# Patient Record
Sex: MALE | Race: WHITE
[De-identification: names, ages, dates, MRNs, and addresses within clinical notes are randomized per-mention and may not be internally consistent; named-entity substitution may affect disease eponyms.]

---

## 2018-03-26 NOTE — EDPHYS
Physician Documentation                                                                           

 [facility]                                                                

Name: Duran Hodge                                                                         

Age: 17 yrs                                                                                       

Sex: Male                                                                                         

: 2000                                                                                   

MRN: [id number]                                                                                   

Arrival Date: 2018                                                                          

Time: 15:24                                                                                       

Account#: [id number]                                                                            

Bed 18                                                                                            

Private MD:                                                                                       

ED Physician Aldair Bess                                                                      

HPI:                                                                                              

                                                                                             

20:00 This 17 yrs old  Male presents to ER via Ambulatory with complaints of Back    pm1 

      Pain.                                                                                       

20:00 The patient presents with pain and an injury. The symptoms are located in the low back. pm1 

      Onset: The symptoms/episode began/occurred 1 month(s) ago. The pain does not radiate.       

      Associated signs and symptoms: Pertinent negatives: abdominal pain, chest pain,             

      dysuria, fever, hematuria, nausea, vomiting. 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      

      cranking a lever repetitively daily. Onset immediately after cranking the lever at work     

      about 1 month ago that comes and goes. Modifying factors: The patient symptoms are          

      alleviated by remaining still, rest, the patient symptoms are aggravated by bending,        

      movement. Severity of symptoms: in the emergency department the symptoms have improved.     

      Work clinic evaluated that patient at recommended evaluation at the emergency               

      department.                                                                                 

                                                                                                  

Historical:                                                                                       

- Allergies:                                                                                      

15:29 No Known Allergies;                                                                     hj  

- Home Meds:                                                                                      

15:29 None [Active];                                                                          hj  

- PMHx:                                                                                           

15:29 None;                                                                                   hj  

- PSHx:                                                                                           

15:29 None;                                                                                   hj  

                                                                                                  

- Immunization history:: Adult Immunizations up to date.                                          

- Social history:: Smoking status: Patient/guardian denies using tobacco.                         

                                                                                                  

                                                                                                  

ROS:                                                                                              

20:00 Constitutional: Negative for fever, chills, and weight loss, Eyes: Negative for injury, pm1 

      pain, redness, and discharge, ENT: Negative for injury, pain, and discharge, Neck:          

      Negative for injury, pain, and swelling, Cardiovascular: Negative for chest pain,           

      palpitations, and edema, Respiratory: Negative for shortness of breath, cough,              

      wheezing, and pleuritic chest pain, Abdomen/GI: Negative for abdominal pain, nausea,        

      vomiting, diarrhea, and constipation.                                                       

20:00 : Negative for injury, bleeding, discharge, and swelling, MS/Extremity: Negative for      

      injury and deformity, Skin: Negative for injury, rash, and discoloration, Neuro:            

      Negative for headache, weakness, numbness, tingling, and seizure.                           

20:00 Back: Positive for of the left low back and right low back.                                 

                                                                                                  

Exam:                                                                                             

20:00 Constitutional:  This is a well developed, well nourished patient who is awake, alert,  pm1 

      and in no acute distress. Head/Face:  Normocephalic, atraumatic. Chest/axilla:  Normal      

      chest wall appearance and motion.  Nontender with no deformity.  No lesions are             

      appreciated. Cardiovascular:  Regular rate and rhythm with a normal S1 and S2.  No          

      gallops, murmurs, or rubs.  Normal PMI, no JVD.  No pulse deficits. Respiratory:  Lungs     

      have equal breath sounds bilaterally, clear to auscultation and percussion.  No rales,      

      rhonchi or wheezes noted.  No increased work of breathing, no retractions or nasal          

      flaring. Abdomen/GI:  Soft, non-tender, with normal bowel sounds.  No distension or         

      tympany.  No guarding or rebound.  No evidence of tenderness throughout.                    

20:00 Skin:  Warm, dry with normal turgor.  Normal color with no rashes, no lesions, and no       

      evidence of cellulitis. MS/ Extremity:  Pulses equal, no cyanosis.  Neurovascular           

      intact.  Full, normal range of motion.                                                      

20:00 Back: ROM is painful, with rotation to the right, with rotation to the left, with           

      flexion, normal spinal alignment noted, muscle spasm, is appreciated in the left low        

      back and right low back.                                                                    

20:00 Neuro: Orientation: is normal, Mentation: is normal, Motor: is normal, moves all fours,     

      strength is normal, strength is 5/5 in all extremities, Sensation: is normal, no            

      obvious gross deficits, Gait: is steady, at a normal pace, without difficulty.              

20:00 Neuro: Deep tendon reflexes are 2+ (normal) in the  right patellar and left patellar.   pm1 

                                                                                                  

Vital Signs:                                                                                      

15:29  / 89; Pulse 65; Resp 18; Temp 97.4(TE); Pulse Ox 100% on R/A; Weight 73.03 kg;   hj  

      Height 5 ft. 10 in. (177.80 cm); Pain 8/10;                                                 

15:29 Body Mass Index 23.10 (73.03 kg, 177.80 cm)                                               

                                                                                                  

MDM:                                                                                              

17:21 Patient medically screened.                                                             [facility] 

18:14 Data reviewed: vital signs. Data interpreted: Pulse oximetry: on room air is 100 %.     pm1 

      Interpretation: normal. Counseling: I had a detailed discussion with the patient and/or     

      guardian regarding: the historical points, exam findings, and any diagnostic results        

      supporting the discharge/admit diagnosis, the need for outpatient follow up, to return      

      to the emergency department if symptoms worsen or persist or if there are any questions     

      or concerns that arise at home.                                                             

                                                                                                  

Administered Medications:                                                                         

No medications were administered                                                                  

                                                                                                  

                                                                                                  

Disposition:                                                                                      

                                                                                             

06:55 Co-signature as Attending Physician, Aldair Bess MD I agree with the assessment and  amparo 

      plan of care.                                                                               

                                                                                                  

Disposition:                                                                                      

18 18:15 Discharged to Home. Impression: Strain of muscle, fascia and tendon of lower       

  back.                                                                                           

- Condition is Stable.                                                                            

- Discharge Instructions: Back Pain, Adult, Muscle Strain, Back Injury Prevention,                

  Easy-to-Read.                                                                                   

- Prescriptions for Naprosyn 500 mg Oral Tablet - take 1 tablet by ORAL route 2 times             

  per day take with food; 30 tablet. Tylenol- Codeine #3 300-30 mg Oral Tablet - take 1           

  tablet by ORAL route every 6 hours As needed; 15 tablet. Cyclobenzaprine 10 mg Oral             

  Tablet - take 1 tablet by ORAL route every 8 hours As needed; 30 tablet.                        

- Work release form, Medication Reconciliation Form, Thank You Letter, Prescription               

  Opioid Use form.                                                                                

- Follow up: Emergency Department; When: As needed; Reason: Worsening of condition.               

  Follow up: Private Physician; When: 2 - 3 days; Reason: Recheck today's complaints,             

  Continuance of care, Re-evaluation by your physician.                                           

- Problem is new.                                                                                 

- Symptoms have improved.                                                                         

                                                                                                  

                                                                                                  

                                                                                                  

Signatures:                                                                                       

Aldair Bess MD MD cha Munoz, Edgar, MARJORIEN                       LVN  Mynor Martin, MANDO                      RN   David Baldwin NP                    NP   pm1                                                  

                                                                                                  

**************************************************************************************************

## 2018-03-26 NOTE — ER
Nurse's Notes                                                                                     

 [facility]                                                                

Name: Duran Hodge                                                                         

Age: 17 yrs                                                                                       

Sex: Male                                                                                         

: 2000                                                                                   

MRN: [id number]                                                                                   

Arrival Date: 2018                                                                          

Time: 15:24                                                                                       

Account#: [id number]                                                                            

Bed 18                                                                                            

Private MD:                                                                                       

Diagnosis: Strain of muscle, fascia and tendon of lower back                                      

                                                                                                  

Presentation:                                                                                     

                                                                                             

15:27 Presenting complaint: Patient states: its been a month, i was bending, lifting,         hj  

      twisting on my job and i felt my back was hurting and its getting worse; now the pain       

      moves to the front lower sides;. Transition of care: patient was not received from          

      another setting of care. Onset of symptoms was 2018. Care prior to arrival:       

      None.                                                                                       

15:27 Method Of Arrival: Ambulatory                                                             

15:27 Acuity: JESSICA 4                                                                           hj  

                                                                                                  

Triage Assessment:                                                                                

15:29 General: Appears in no apparent distress. uncomfortable, Behavior is calm, cooperative, hj  

      appropriate for age. Pain: Complains of pain in lumbar area, left low back and right        

      low back. GI: Reports lower abdominal pain.                                                 

                                                                                                  

Historical:                                                                                       

- Allergies:                                                                                      

15:29 No Known Allergies;                                                                     hj  

- Home Meds:                                                                                      

15:29 None [Active];                                                                          hj  

- PMHx:                                                                                           

15:29 None;                                                                                   hj  

- PSHx:                                                                                           

15:29 None;                                                                                   hj  

                                                                                                  

- Immunization history:: Adult Immunizations up to date.                                          

- Social history:: Smoking status: Patient/guardian denies using tobacco.                         

                                                                                                  

                                                                                                  

Screenin:42 Abuse screen: Denies threats or abuse. Nutritional screening: No deficits noted.        em  

      Tuberculosis screening: No symptoms or risk factors identified. Fall Risk None              

      identified.                                                                                 

                                                                                                  

Assessment:                                                                                       

15:32 GI: Bowel sounds present X 4 quads. Abd is soft.                                        hj  

17:45 General: Appears in no apparent distress. comfortable, Behavior is calm, cooperative,   em  

      reports he hurt his lower back while at work, radiates to his abdomen, denies urinating     

      problems, numbness or tingling to lower extremities . Pain: Complains of pain in right      

      low back and left low back and lumbar area Pain radiates to abdomen Pain currently is 8     

      out of 10 on a pain scale. Pain began 1 month ago. Neuro: Level of Consciousness is         

      awake, alert, obeys commands, Oriented to person, place, time, situation.                   

      Cardiovascular: Capillary refill < 3 seconds Patient's skin is warm and dry.                

      Respiratory: Airway is patent Respiratory effort is even, unlabored, Respiratory            

      pattern is regular, symmetrical. GI: Abdomen is flat, Bowel sounds present X 4 quads.       

      Abd is soft and non tender X 4 quads. : No signs and/or symptoms were reported            

      regarding the genitourinary system. EENT: No signs and/or symptoms were reported            

      regarding the EENT system. Derm: Skin is intact, Skin is pink, warm \T\ dry.                

      Musculoskeletal: Range of motion: intact in all extremities.                                

18:00 Reassessment: Patient appears in no apparent distress at this time. I agree with the    iw  

      above assessment by Horacio Marques LVN.                                                       

                                                                                                  

Vital Signs:                                                                                      

15:29  / 89; Pulse 65; Resp 18; Temp 97.4(TE); Pulse Ox 100% on R/A; Weight 73.03 kg;   hj  

      Height 5 ft. 10 in. (177.80 cm); Pain 8/10;                                                 

15:29 Body Mass Index 23.10 (73.03 kg, 177.80 cm)                                               

                                                                                                  

ED Course:                                                                                        

15:24 Patient arrived in ED.                                                                  mr  

15:28 Triage completed.                                                                       hj  

15:32 Arm band placed on left wrist.                                                          hj  

17:20 David Ramirez NP is PHCP.                                                           pm1 

17:20 Aldair Bess MD is Attending Physician.                                             pm1 

17:33 Horacio Marques LVN is Primary Nurse.                                                     em  

17:42 Patient has correct armband on for positive identification. Bed in low position. Call   em  

      light in reach. Side rails up X2.                                                           

17:42 No provider procedures requiring assistance completed.                                  em  

18:40 Patient did not have IV access during this emergency room visit.                        em  

                                                                                                  

Administered Medications:                                                                         

No medications were administered                                                                  

                                                                                                  

                                                                                                  

Outcome:                                                                                          

18:15 Discharge ordered by MD.                                                                pm1 

18:40 Discharged to home ambulatory.                                                          em  

18:40 Condition: good                                                                             

18:40 Discharge instructions given to patient, Instructed on discharge instructions, follow       

      up and referral plans. medication usage, Demonstrated understanding of instructions,        

      follow-up care, medications, Prescriptions given X 3.                                       

18:41 Patient left the ED.                                                                    em  

                                                                                                  

Signatures:                                                                                       

Lozada Claudia pardo                                                   

MarquesHoracio LVN LVN  em                                                   

Fallon Stark RN RN                                                      

Mynor Nick RN RN                                                      

David Ramirez NP                    NP   pm1                                                  

                                                                                                  

**************************************************************************************************

## 2018-06-09 NOTE — EDPHYS
Physician Documentation                                                                           

 [facility]                                                                

Name: Duran Hodge                                                                         

Age: 17 yrs                                                                                       

Sex: Male                                                                                         

: 2000                                                                                   

MRN: [id number]                                                                                   

Arrival Date: 2018                                                                          

Time: 03:24                                                                                       

Account#: [id number]                                                                            

Bed 17                                                                                            

Private MD:                                                                                       

ED Physician Fred Jameson                                                                         

HPI:                                                                                              

                                                                                             

03:46 This 17 yrs old  Male presents to ER via Unassigned with complaints of Rash.   rn  

03:46 The patient's rash thought to be caused by an unknown cause. The rash is located on the rn  

      buttocks. Onset: The symptoms/episode began/occurred 1 week(s) ago. Severity of             

      symptoms: At their worst the symptoms were mild in the emergency department the             

      symptoms are unchanged. The patient has not experienced similar symptoms in the past.       

      Reports noticed itching and redness of perianal area, took picture, noticed what looked     

      like worm. No fever, no other complaints..                                                  

                                                                                                  

Historical:                                                                                       

- Allergies:                                                                                      

03:56 No Known Allergies;                                                                     bs1 

- Home Meds:                                                                                      

03:56 None [Active];                                                                          bs1 

- PMHx:                                                                                           

03:56 None;                                                                                   bs1 

- PSHx:                                                                                           

03:56 None;                                                                                   bs1 

                                                                                                  

- Immunization history:: Adult Immunizations up to date.                                          

- Social history:: Smoking status: Patient/guardian denies using tobacco.                         

- Family history:: not pertinent.                                                                 

- Ebola Screening: : Patient negative for fever greater than or equal to 101.5 degrees            

  Fahrenheit, and additional compatible Ebola Virus Disease symptoms Patient denies               

  exposure to infectious person.                                                                  

- Hospitalizations: : No recent hospitalization is reported.                                      

                                                                                                  

                                                                                                  

ROS:                                                                                              

03:46 Constitutional: Negative for fever, chills, and weight loss, Skin: Negative for injury, rn  

      + rash and discoloration                                                                    

                                                                                                  

Exam:                                                                                             

03:46 Constitutional:  This is a well developed, well nourished patient who is awake, alert,  rn  

      and in no acute distress. Skin:  Warm, dry, + perianal erythema, + visible multiple         

      pinworms, also noticed 2 other pinpoint holes, appears like possible sinuses in gluteal     

      cleft, no sign of infection or drainage from holes.                                         

                                                                                                  

Vital Signs:                                                                                      

03:40  / 96 LA Sitting (auto/reg); Pulse 74 LA; Resp 16 S; Temp 98.2(O); Pulse Ox 100%  bs1 

      on R/A; Weight 78.47 kg; Height 5 ft. 11 in. (180.34 cm); Pain 5/10;                        

03:40 Body Mass Index 24.13 (78.47 kg, 180.34 cm)                                             bs1 

                                                                                                  

MDM:                                                                                              

03:33 Patient medically screened.                                                             rn  

03:46 Differential diagnosis: pinworms, sinus, cysts, fistulae. Data reviewed: vital signs,   rn  

      nurses notes, and as a result, I will discharge patient. Counseling: I had a detailed       

      discussion with the patient and/or guardian regarding: the historical points, exam          

      findings, and any diagnostic results supporting the discharge/admit diagnosis, the need     

      for outpatient follow up, to return to the emergency department if symptoms worsen or       

      persist or if there are any questions or concerns that arise at home. Special               

      discussion: I discussed with the patient/guardian in detail that at this point there is     

      no indication for admission to the hospital. It is understood, however, that if the         

      symptoms persist or worsen the patient needs to return immediately for re-evaluation.       

      Based on the history and exam findings, there is no indication for further emergent         

      testing or inpatient evaluation. I discussed with the patient/guardian the need to see      

      the gastroenterologist for further evaluation of the symptoms.                              

03:50 ED course: Recommended good hygeine and OTC pin worm treatment. Also GI f/u in case     rn  

      these holes are early fistula or sinuses.                                                   

                                                                                                  

Administered Medications:                                                                         

No medications were administered                                                                  

                                                                                                  

                                                                                                  

Disposition:                                                                                      

18 03:49 Discharged to Home. Impression: Enterobiasis.                                      

- Condition is Stable.                                                                            

- Discharge Instructions: Pinworms, Pediatric.                                                    

                                                                                                  

- Medication Reconciliation Form, Thank You Letter, Antibiotic Education, Prescription            

  Opioid Use form.                                                                                

- Follow up: Emamnuel Ruvalcaba MD; When: As needed; Reason: Recheck today's complaints,            

  Re-evaluation by your physician.                                                                

- Problem is new.                                                                                 

- Symptoms have improved.                                                                         

                                                                                                  

                                                                                                  

                                                                                                  

Signatures:                                                                                       

Fred Jameson MD MD rn Salazar, Brittany, RN                   RN   bs1                                                  

                                                                                                  

Corrections: (The following items were deleted from the chart)                                    

04:05 03:49 2018 03:49 Discharged to Home. Impression: Enterobiasis. Condition is       bs1 

      Stable. Forms are Medication Reconciliation Form, Thank You Letter, Antibiotic              

      Education, Prescription Opioid Use. Follow up: Emmanuel Ruvalcaba; When: As needed; Reason:     

      Recheck today's complaints, Re-evaluation by your physician. Problem is new. Symptoms       

      have improved. rn                                                                           

                                                                                                  

**************************************************************************************************

## 2018-06-09 NOTE — ER
Nurse's Notes                                                                                     

 [facility]                                                                

Name: Duran Hodge                                                                         

Age: 17 yrs                                                                                       

Sex: Male                                                                                         

: 2000                                                                                   

MRN: [id number]                                                                                   

Arrival Date: 2018                                                                          

Time: 03:24                                                                                       

Account#: [id number]                                                                            

Bed 17                                                                                            

Private MD:                                                                                       

Diagnosis: Enterobiasis                                                                           

                                                                                                  

Presentation:                                                                                     

                                                                                             

03:40 Presenting complaint: Patient states: "I have this rash on mt butt for about 1 week, it bs1 

      itches and I saw a worm down there.".                                                       

03:40 Method Of Arrival: Ambulatory                                                           bs1 

03:40 Transition of care: patient was not received from another setting of care. Onset of     bs1 

      symptoms was 2018. Risk Assessment: Do you want to hurt yourself or someone         

      else? Patient reports no desire to harm self or others. Initial Sepsis Screen: Does the     

      patient meet any 2 criteria? No. Patient's initial sepsis screen is negative. Does the      

      patient have a suspected source of infection? No. Patient's initial sepsis screen is        

      negative. Care prior to arrival: None.                                                      

03:40 Acuity: JESSICA 4                                                                           bs1 

                                                                                                  

Historical:                                                                                       

- Allergies:                                                                                      

03:56 No Known Allergies;                                                                     bs1 

- Home Meds:                                                                                      

03:56 None [Active];                                                                          bs1 

- PMHx:                                                                                           

03:56 None;                                                                                   bs1 

- PSHx:                                                                                           

03:56 None;                                                                                   bs1 

                                                                                                  

- Immunization history:: Adult Immunizations up to date.                                          

- Social history:: Smoking status: Patient/guardian denies using tobacco.                         

- Family history:: not pertinent.                                                                 

- Ebola Screening: : Patient negative for fever greater than or equal to 101.5 degrees            

  Fahrenheit, and additional compatible Ebola Virus Disease symptoms Patient denies               

  exposure to infectious person.                                                                  

- Hospitalizations: : No recent hospitalization is reported.                                      

                                                                                                  

                                                                                                  

Screenin:51 Abuse screen: Denies threats or abuse. Denies injuries from another. Nutritional        bs1 

      screening: No deficits noted. Tuberculosis screening: No symptoms or risk factors           

      identified. Fall Risk None identified.                                                      

                                                                                                  

Assessment:                                                                                       

03:52 General: Appears in no apparent distress. uncomfortable, Behavior is calm, cooperative, bs1 

      appropriate for age. Pain: Complains of pain in buttocks. Neuro: Level of Consciousness     

      is awake, alert, obeys commands, Oriented to person, place, time, situation,                

      Appropriate for age. Cardiovascular: Heart tones S1 S2 present Capillary refill < 3         

      seconds Patient's skin is warm and dry. Respiratory: Airway is patent Trachea midline       

      Respiratory effort is even, unlabored, Breath sounds are clear bilaterally. GI: Abdomen     

      is flat, Bowel sounds present X 4 quads. Reports pain to buttocks. : No signs and/or      

      symptoms were reported regarding the genitourinary system. EENT: No signs and/or            

      symptoms were reported regarding the EENT system. Derm: Rash noted that is on buttocks      

      cyst noted to right side of lower buttocks, multiple pinworms noted, redness noted.         

      Musculoskeletal: Circulation, motion, and sensation intact. Capillary refill < 3            

      seconds, Range of motion: intact in all extremities.                                        

                                                                                                  

Vital Signs:                                                                                      

03:40  / 96 LA Sitting (auto/reg); Pulse 74 LA; Resp 16 S; Temp 98.2(O); Pulse Ox 100%  bs1 

      on R/A; Weight 78.47 kg; Height 5 ft. 11 in. (180.34 cm); Pain 5/10;                        

03:40 Body Mass Index 24.13 (78.47 kg, 180.34 cm)                                             bs1 

                                                                                                  

ED Course:                                                                                        

03:24 Patient arrived in ED.                                                                  am2 

03:30 Melissa Albarran RN is Primary Nurse.                                                 bs1 

03:33 Fred Jameson MD is Attending Physician.                                                rn  

03:48 Triage completed.                                                                       bs1 

03:49 Emmanuel Ruvalcaba MD is Referral Physician.                                              rn  

03:52 Patient has correct armband on for positive identification. Placed in gown. Bed in low  bs1 

      position. Call light in reach. Side rails up X 1. Pulse ox on. NIBP on.                     

03:55 No provider procedures requiring assistance completed. Patient did not have IV access   bs1 

      during this emergency room visit.                                                           

03:56 Patient placed in waiting room.                                                         bs1 

                                                                                                  

Administered Medications:                                                                         

No medications were administered                                                                  

                                                                                                  

                                                                                                  

Outcome:                                                                                          

03:49 Discharge ordered by MD.                                                                rn  

03:56 Discharged to home ambulatory.                                                          bs1 

03:56 Condition: stable                                                                           

04:04 Discharge instructions given to patient, Instructed on discharge instructions, follow   bs1 

      up and referral plans. Demonstrated understanding of instructions, follow-up care.          

04:05 Patient left the ED.                                                                    bs1 

                                                                                                  

Signatures:                                                                                       

Fred Jameson MD MD rn Moreno, Amanda                               am2                                                  

Melissa Albarran RN                   RN   bs1                                                  

                                                                                                  

Corrections: (The following items were deleted from the chart)                                    

03:49 03:40 Acuity: JESSICA 5 bs1                                                                 bs1 

                                                                                                  

**************************************************************************************************

## 2018-06-11 NOTE — ER
Nurse's Notes                                                                                     

 [facility]                                                                

Name: Duran Hodge                                                                         

Age: 17 yrs                                                                                       

Sex: Male                                                                                         

: 2000                                                                                   

MRN: [id number]                                                                                   

Arrival Date: 2018                                                                          

Time: 01:56                                                                                       

Account#: [id number]                                                                            

Bed 20                                                                                            

Private MD:                                                                                       

Diagnosis: Blood in stools                                                                        

                                                                                                  

Presentation:                                                                                     

                                                                                             

02:14 Presenting complaint: Patient states: "when I wiped I had blood on the toilet paper. I  jd3 

      have a cyst near my but, and I don't know if it has popped or what. no in to much pain,     

      just uncomfortable. I was also seen here recently with [facility].". Transition of care:     

      patient was not received from another setting of care. Onset of symptoms was 2018. Risk Assessment: Do you want to hurt yourself or someone else? Patient reports no     

      desire to harm self or others. Initial Sepsis Screen: Does the patient meet any 2           

      criteria? No. Patient's initial sepsis screen is negative. Does the patient have a          

      suspected source of infection? No. Patient's initial sepsis screen is negative. Care        

      prior to arrival: None.                                                                     

02:14 Method Of Arrival: Ambulatory                                                           jd3 

02:14 Acuity: JESSICA 4                                                                           jd3 

                                                                                                  

Historical:                                                                                       

- Allergies:                                                                                      

02:17 No Known Allergies;                                                                     jd3 

- Home Meds:                                                                                      

02:17 None [Active];                                                                          jd3 

- PMHx:                                                                                           

02:17 None;                                                                                   jd3 

- PSHx:                                                                                           

02:17 None;                                                                                   jd3 

                                                                                                  

- Immunization history:: Adult Immunizations up to date.                                          

- Social history:: Smoking status: Patient/guardian denies using tobacco.                         

- Ebola Screening: : Patient negative for fever greater than or equal to 101.5 degrees            

  Fahrenheit, and additional compatible Ebola Virus Disease symptoms.                             

                                                                                                  

                                                                                                  

Screenin:20 Abuse screen: Denies threats or abuse. Nutritional screening: No deficits noted.        jd3 

      Tuberculosis screening: No symptoms or risk factors identified. Fall Risk No IV (0          

      pts). Ambulatory Aid- None/Bed Rest/Nurse Assist (0 pts). Gait- Normal/Bed                  

      Rest/Wheelchair (0 pts) Mental Status- Oriented to own ability (0 pts). Total [place]         

      Fall Scale indicates No Risk (0-24 pts).                                                    

                                                                                                  

Assessment:                                                                                       

02:20 General: Appears uncomfortable, Behavior is calm, cooperative, appropriate for age.     jd3 

      Pain: Complains of pain in buttocks Pain currently is 2 out of 10 on a pain scale.          

      Quality of pain is described as aching. Neuro: Level of Consciousness is awake, alert,      

      obeys commands, Oriented to person, place, time, situation. Cardiovascular: Heart tones     

      S1 S2 present Capillary refill < 3 seconds Patient's skin is warm and dry. Respiratory:     

      Airway is patent Respiratory effort is even, unlabored, Respiratory pattern is regular,     

      symmetrical, Breath sounds are clear bilaterally. GI: Abdomen is flat, Abd is soft and      

      non tender X 4 quads. Reports blood on toilet paper when I wipe. Patient currently          

      denies nausea, vomiting. : No signs and/or symptoms were reported regarding the           

      genitourinary system. EENT: No signs and/or symptoms were reported regarding the EENT       

      system. Derm: Skin is intact, Skin is dry, Skin is normal, Skin temperature is warm.        

      Musculoskeletal: Circulation, motion, and sensation intact. Range of motion: intact in      

      all extremities.                                                                            

02:54 Reassessment: Patient appears in no apparent distress at this time. Patient and/or      jd3 

      family updated on plan of care and expected duration. Pain level reassessed. Patient is     

      alert, oriented x 3, equal unlabored respirations, skin warm/dry/pink. reported             

      understanding of discharge instructions, even and steady gait upon discharge.               

                                                                                                  

Vital Signs:                                                                                      

02:17  / 74; Pulse 85; Resp 17 S; Temp 98.4(O); Pulse Ox 100% on R/A; Weight 77.11 kg   [facility] 

      (R); Height 5 ft. 10 in. (177.80 cm) (R); Pain 3/10;                                        

02:17 Body Mass Index 24.39 (77.11 kg, 177.80 cm)                                             [facility] 

                                                                                                  

ED Course:                                                                                        

01:56 Patient arrived in ED.                                                                  am2 

02:02 Kirk Ron RN is Primary Nurse.                                                  jd3 

02:04 John Gamez MD is Attending Physician.                                                    pkl 

02:16 Triage completed.                                                                       jd3 

02:19 Arm band placed on.                                                                     jd3 

02:20 Patient has correct armband on for positive identification. Bed in low position. Call   [facility] 

      light in reach. Side rails up X 1. Adult w/ patient.                                        

02:48 Alex Ambroico MD is Referral Physician.                                                  pkl 

02:55 No provider procedures requiring assistance completed. Patient did not have IV access   jd3 

      during this emergency room visit.                                                           

                                                                                                  

Administered Medications:                                                                         

No medications were administered                                                                  

                                                                                                  

                                                                                                  

Outcome:                                                                                          

02:48 Discharge ordered by MD.                                                                gladys 

02:56 Discharged to home ambulatory, with family.                                             jd3 

02:56 Condition: stable                                                                           

02:56 Discharge instructions given to patient, family, Instructed on discharge instructions,      

      follow up and referral plans. medication usage, Demonstrated understanding of               

      instructions, follow-up care, medications, Prescriptions given X 1.                         

02:57 Patient left the ED.                                                                    jd3 

                                                                                                  

Signatures:                                                                                       

John Gamez MD MD pkl Moreno, Amanda am2 Davies, Jonathon, RN                    RN   jd3                                                  

                                                                                                  

**************************************************************************************************

## 2018-06-11 NOTE — EDPHYS
Physician Documentation                                                                           

 [facility]                                                                

Name: Duran Hodge                                                                         

Age: 17 yrs                                                                                       

Sex: Male                                                                                         

: 2000                                                                                   

MRN: [id number]                                                                                   

Arrival Date: 2018                                                                          

Time: 01:56                                                                                       

Account#: [id number]                                                                            

Bed 20                                                                                            

Private MD:                                                                                       

ED Physician John Gamez                                                                             

HPI:                                                                                              

                                                                                             

02:43 This 17 yrs old  Male presents to ER via Ambulatory with complaints of Bloody  pkl 

      Stools.                                                                                     

02:43 The patient presents to the emergency department with bleeding from the rectum/anus,    pkl 

      that is mild. Onset: The symptoms/episode began/occurred just prior to arrival, 1           

      hour(s) ago. The patient has experienced a previous episode, approximately 1 years ago,     

      Saw Dr. Ambrocio.                                                                               

                                                                                                  

Historical:                                                                                       

- Allergies:                                                                                      

02:17 No Known Allergies;                                                                     jd3 

- Home Meds:                                                                                      

02:17 None [Active];                                                                          jd3 

- PMHx:                                                                                           

02:17 None;                                                                                   jd3 

- PSHx:                                                                                           

02:17 None;                                                                                   jd3 

                                                                                                  

- Immunization history:: Adult Immunizations up to date.                                          

- Social history:: Smoking status: Patient/guardian denies using tobacco.                         

- Ebola Screening: : Patient negative for fever greater than or equal to 101.5 degrees            

  Fahrenheit, and additional compatible Ebola Virus Disease symptoms.                             

                                                                                                  

                                                                                                  

ROS:                                                                                              

02:43 Eyes: Negative for injury, pain, redness, and discharge, ENT: Negative for injury,      pkl 

      pain, and discharge, Neck: Negative for injury, pain, and swelling, Cardiovascular:         

      Negative for chest pain, palpitations, and edema, Respiratory: Negative for shortness       

      of breath, cough, wheezing, and pleuritic chest pain.                                       

02:43 Abdomen/GI: Positive for rectal bleeding.                                                   

02:43 Back: Negative for acute changes.                                                           

02:43 : Negative for urinary symptoms.                                                          

02:43 MS/extremity: Negative for acute changes.                                                   

02:43 Skin: Negative for rash.                                                                    

02:43 Neuro: Negative for altered mental status.                                                  

                                                                                                  

Exam:                                                                                             

02:43 Head/Face:  Normocephalic, atraumatic. Eyes:  Pupils equal round and reactive to light, pkl 

      extra-ocular motions intact.  Lids and lashes normal.  Conjunctiva and sclera are           

      non-icteric and not injected.  Cornea within normal limits.  Periorbital areas with no      

      swelling, redness, or edema. ENT:  Nares patent. No nasal discharge, no septal              

      abnormalities noted.  Tympanic membranes are normal and external auditory canals are        

      clear.  Oropharynx with no redness, swelling, or masses, exudates, or evidence of           

      obstruction, uvula midline.  Mucous membranes moist. Neck:  Trachea midline, no             

      thyromegaly or masses palpated, and no cervical lymphadenopathy.  Supple, full range of     

      motion without nuchal rigidity, or vertebral point tenderness.  No Meningismus.             

      Chest/axilla:  Normal chest wall appearance and motion.  Nontender with no deformity.       

      No lesions are appreciated. Cardiovascular:  Regular rate and rhythm with a normal S1       

      and S2.  No gallops, murmurs, or rubs.  Normal PMI, no JVD.  No pulse deficits.             

      Respiratory:  Lungs have equal breath sounds bilaterally, clear to auscultation and         

      percussion.  No rales, rhonchi or wheezes noted.  No increased work of breathing, no        

      retractions or nasal flaring.                                                               

02:43 Abdomen/GI: Bowel sounds: normal, Palpation: abdomen is soft and non-tender, in all         

      quadrants, Rectal exam: Stool: Trace  of  blood.                                            

02:43 Back: Exam negative for acute changes.                                                      

02:43 : Exam negative for acute changes.                                                        

02:43 Musculoskeletal/extremity: Exam is negative for acute changes.                              

02:43 Skin: Exam negative for rash.                                                               

02:43 Neuro: Orientation: is normal, Mentation: is normal, Cranial nerves: grossly normal.        

                                                                                                  

Vital Signs:                                                                                      

02:17  / 74; Pulse 85; Resp 17 S; Temp 98.4(O); Pulse Ox 100% on R/A; Weight 77.11 kg   jd3 

      (R); Height 5 ft. 10 in. (177.80 cm) (R); Pain 3/10;                                        

02:17 Body Mass Index 24.39 (77.11 kg, 177.80 cm)                                             jd3 

                                                                                                  

MDM:                                                                                              

02:04 Patient medically screened.                                                             pkl 

02:47 Data reviewed: vital signs, nurses notes.                                               pkl 

                                                                                                  

Administered Medications:                                                                         

No medications were administered                                                                  

                                                                                                  

                                                                                                  

Disposition:                                                                                      

18 02:48 Discharged to Home. Impression: Blood in stools.                                   

- Condition is Stable.                                                                            

                                                                                                  

- Prescriptions for Anusol- HC 25 mg Rectal Suppository - insert 1 suppository by                 

  RECTAL route every 12 hours As needed; 10 suppository.                                          

- Medication Reconciliation Form, Thank You Letter, Antibiotic Education, Prescription            

  Opioid Use form.                                                                                

- Follow up: Alex Ambrocio MD; When: 2 - 3 days; Reason: Re-evaluation by your physician.          

- Problem is new.                                                                                 

- Symptoms have improved.                                                                         

                                                                                                  

                                                                                                  

                                                                                                  

Signatures:                                                                                       

John Gamez MD MD   pkl                                                  

Kirk Ron, RN                    RN   jd3                                                  

                                                                                                  

Corrections: (The following items were deleted from the chart)                                    

02:57 02:48 2018 02:48 Discharged to Home. Impression: Blood in stools. Condition is    jd3 

      Stable. Forms are Medication Reconciliation Form, Thank You Letter, Antibiotic              

      Education, Prescription Opioid Use. Follow up: Alex Ambrocio; When: 2 - 3 days; Reason:        

      Re-evaluation by your physician. Problem is new. Symptoms have improved. pkl                

                                                                                                  

**************************************************************************************************

## 2018-06-18 NOTE — ER
Nurse's Notes                                                                                     

 [facility]                                                                

Name: Duran Hodge                                                                         

Age: 17 yrs                                                                                       

Sex: Male                                                                                         

: 2000                                                                                   

MRN: [id number]                                                                                   

Arrival Date: 2018                                                                          

Time: 18:46                                                                                       

Account#: [id number]                                                                            

Bed 28                                                                                            

Private MD:                                                                                       

Diagnosis: Encounter for change or removal of surgical wound dressing                             

                                                                                                  

Presentation:                                                                                     

                                                                                             

18:47 Presenting complaint: Patient states: S/P I\T\D abscess on the buttocks today 1245pm and  hj

      was D/C'd with packing; pt and family was concerned about profuse bleeding; pain is         

      7/10; for follow up with surgeon on Friday;. Transition of care: patient was not            

      received from another setting of care. Onset of symptoms was 2018. Risk            

      Assessment: Do you want to hurt yourself or someone else? Patient reports no desire to      

      harm self or others. Initial Sepsis Screen: Does the patient meet any 2 criteria? No.       

      Patient's initial sepsis screen is negative. Does the patient have a suspected source       

      of infection? Yes:. Care prior to arrival: None.                                            

18:47 Method Of Arrival: Ambulatory                                                             

18:47 Acuity: JESSICA 4                                                                           hj  

                                                                                                  

Triage Assessment:                                                                                

18:50 General: Appears in no apparent distress. uncomfortable, Behavior is calm, cooperative, hj  

      appropriate for age. Pain: Complains of pain in coccyx.                                     

                                                                                                  

Historical:                                                                                       

- Allergies:                                                                                      

18:49 No Known Allergies;                                                                     hj  

- Home Meds:                                                                                      

18:49 None [Active];                                                                          hj  

- PMHx:                                                                                           

18:49 None;                                                                                   hj  

- PSHx:                                                                                           

18:49 I\T\D absscess;                                                                           hj

                                                                                                  

- Immunization history:: Adult Immunizations unknown.                                             

- Social history:: Smoking status: Patient/guardian denies using tobacco,                         

  Patient/guardian denies using alcohol.                                                          

- Ebola Screening: : Patient negative for fever greater than or equal to 101.5 degrees            

  Fahrenheit, and additional compatible Ebola Virus Disease symptoms Patient denies               

  exposure to infectious person Patient denies travel to an Ebola-affected area in the            

  21 days before illness onset.                                                                   

                                                                                                  

                                                                                                  

Screenin:50 Abuse screen: Denies threats or abuse. Denies injuries from another. Nutritional        hj  

      screening: No deficits noted. Tuberculosis screening: No symptoms or risk factors           

      identified. Fall Risk None identified.                                                      

                                                                                                  

Assessment:                                                                                       

19:06 General: Appears uncomfortable, slender, well groomed, well developed, well nourished,  tl3 

      Behavior is calm, cooperative, appropriate for age. Pain: Denies pain. Neuro: Level of      

      Consciousness is awake, alert, obeys commands, Oriented to person, place, time,             

      situation, Appropriate for age. Cardiovascular: Patient's skin is warm and dry.             

      Respiratory: Airway is patent Respiratory effort is even, unlabored, Respiratory            

      pattern is regular, symmetrical. GI: No signs and/or symptoms were reported involving       

      the gastrointestinal system. : No signs and/or symptoms were reported regarding the       

      genitourinary system. EENT: No signs and/or symptoms were reported regarding the EENT       

      system. Derm: Skin is pink, warm \T\ dry. Skin temperature is warm Wound noted buttocks     

      and coccyx. Musculoskeletal: No signs and/or symptoms reported regarding the                

      musculoskeletal system.                                                                     

                                                                                                  

Vital Signs:                                                                                      

18:50  / 83; Pulse 77; Resp 18; Temp 98.0(O); Pulse Ox 97% on R/A; Weight 79.38 kg;     hj  

      Height 5 ft. 10 in. (177.80 cm); Pain 8/10;                                                 

18:50 Body Mass Index 25.11 (79.38 kg, 177.80 cm)                                             hj  

                                                                                                  

ED Course:                                                                                        

18:46 Patient arrived in ED.                                                                  hj  

18:49 Triage completed.                                                                       hj  

18:50 Arm band placed on left wrist.                                                          hj  

18:52 Aldair Herrera PA is PHCP.                                                                cp  

18:52 Aldair Bess MD is Attending Physician.                                             cp  

19:06 Edyta Rapp, RN is Primary Nurse.                                                     tl3 

19:06 Patient has correct armband on for positive identification. Placed in gown. Bed in low  tl3 

      position. Call light in reach. Side rails up X2. Adult w/ patient.                          

19:06 No provider procedures requiring assistance completed. Patient did not have IV access   tl3 

      during this emergency room visit.                                                           

19:23 Dressings: ABD pad X 1; coccyx and gluteal cleft foam tape and stockingette underwear.  tl3 

19:27 Mynor Manning MD is Referral Physician.                                              cp  

                                                                                                  

Administered Medications:                                                                         

No medications were administered                                                                  

                                                                                                  

                                                                                                  

Outcome:                                                                                          

19:23 Discharged to home ambulatory.                                                          tl3 

19:23 Condition: good                                                                             

19:23 Discharge instructions given to patient, family, Instructed on discharge instructions,      

      follow up and referral plans. medication usage, Demonstrated understanding of               

      instructions, follow-up care, medications, wound care.                                      

19:28 Discharge ordered by MD. marina  

19:31 Patient left the ED.                                                                    tl3 

                                                                                                  

Signatures:                                                                                       

Mynor Nick RN                      RN   Aldair Brooks PA PA cp Lowrey, Tammy, MANDO                       RN   tl3                                                  

                                                                                                  

Corrections: (The following items were deleted from the chart)                                    

18:52 18:50 Pulse 93bpm; Resp 18bpm; Pulse Ox 100% RA; Temp 98.0F Oral; 79.38 kg; Height 5    hj  

      ft. 10 in.; BMI: 25.1; Pain 8/10; hj                                                        

                                                                                                  

**************************************************************************************************

## 2018-06-18 NOTE — EDPHYS
Physician Documentation                                                                           

 [facility]                                                                

Name: Duran Hodge                                                                         

Age: 17 yrs                                                                                       

Sex: Male                                                                                         

: 2000                                                                                   

MRN: [id number]                                                                                   

Arrival Date: 2018                                                                          

Time: 18:46                                                                                       

Account#: [id number]                                                                            

Bed 28                                                                                            

Private MD:                                                                                       

ED Physician Aldair Bess                                                                      

HPI:                                                                                              

                                                                                             

19:18 This 17 yrs old  Male presents to ER via Ambulatory with complaints of Abscess cp  

      Recheck.                                                                                    

19:18 Patient presents to ED for recheck of: abscess. The affected area is on the coccyx.     cp  

      Previous treatment: The patient was initially treated on 2018, the care was        

      rendered at [facility], Treatment type: The patient's original       

      treatment included an I\T\D. Progress: The patient reports patient and family concerned     

      about increased bleeding.                                                                   

                                                                                                  

Historical:                                                                                       

- Allergies:                                                                                      

18:49 No Known Allergies;                                                                     hj  

- Home Meds:                                                                                      

18:49 None [Active];                                                                          hj  

- PMHx:                                                                                           

18:49 None;                                                                                   hj  

- PSHx:                                                                                           

18:49 I\T\D absscess;                                                                           hj

                                                                                                  

- Immunization history:: Adult Immunizations unknown.                                             

- Social history:: Smoking status: Patient/guardian denies using tobacco,                         

  Patient/guardian denies using alcohol.                                                          

- Ebola Screening: : Patient negative for fever greater than or equal to 101.5 degrees            

  Fahrenheit, and additional compatible Ebola Virus Disease symptoms Patient denies               

  exposure to infectious person Patient denies travel to an Ebola-affected area in the            

  21 days before illness onset.                                                                   

                                                                                                  

                                                                                                  

ROS:                                                                                              

19:21 Constitutional: Negative for body aches, chills, fever, poor PO intake.                 cp  

19:21 Cardiovascular: Negative for chest pain, edema, palpitations.                               

19:21 Respiratory: Negative for cough, shortness of breath, wheezing.                             

19:21 Abdomen/GI: Negative for abdominal pain, nausea, vomiting, and diarrhea.                    

19:21 Skin: Positive for of the coccyx, open wound.                                               

19:21 Neuro: Negative for altered mental status, headache, weakness.                              

19:21 All other systems are negative.                                                             

                                                                                                  

Exam:                                                                                             

19:24 Head/Face:  Normocephalic, atraumatic.                                                  cp  

19:24 Constitutional: The patient appears in no acute distress, alert, awake, non-toxic, well     

      developed, well nourished.                                                                  

19:24 Eyes: Periorbital structures: appear normal, Conjunctiva: normal, no exudate, no            

      injection, Lids and lashes: appear normal, bilaterally.                                     

19:24 ENT: External ear(s): are unremarkable, Nose: is normal, Mouth: is normal.                  

19:24 Chest/axilla: Inspection: normal.                                                           

19:24 Cardiovascular: Rate: normal, Rhythm: regular.                                              

19:24 Respiratory: the patient does not display signs of respiratory distress,  Respirations:     

      normal, no use of accessory muscles, no retractions, no splinting, no tachypnea,            

      labored breathing, is not present.                                                          

19:24 Abdomen/GI: Exam negative for discomfort, distension, guarding, Inspection: abdomen         

      appears normal.                                                                             

19:24 Skin: Wound recheck: Abscess: the wound has not changed, the packing is in place,           

      external dressing removed, no gross bleeding observed, internal packing not changed and     

      external dressing replaced by nursing staff. .                                              

                                                                                                  

Vital Signs:                                                                                      

18:50  / 83; Pulse 77; Resp 18; Temp 98.0(O); Pulse Ox 97% on R/A; Weight 79.38 kg;     hj  

      Height 5 ft. 10 in. (177.80 cm); Pain 8/10;                                                 

18:50 Body Mass Index 25.11 (79.38 kg, 177.80 cm)                                             hj  

                                                                                                  

MDM:                                                                                              

18:53 Patient medically screened.                                                             [facility] 

19:22 Physician consultation: Mynor Manning MD was contacted at 19:10, sees patient          cp  

      immediately while in exam room and explains process of healing and wound care post I\T\D    

      over next several days until clinical f/u.                                                  

19:27 Data reviewed: vital signs, nurses notes, and as a result, I will discharge patient.    cp  

                                                                                                  

Administered Medications:                                                                         

No medications were administered                                                                  

                                                                                                  

                                                                                                  

Disposition:                                                                                      

19:45 Chart complete.                                                                         cp  

                                                                                             

06:51 Co-signature as Attending Physician, Aldair Bess MD I agree with the assessment and  [facility] 

      plan of care.                                                                               

                                                                                                  

Disposition:                                                                                      

18 19:28 Discharged to Home. Impression: Encounter for change or removal of surgical        

  wound dressing.                                                                                 

- Condition is Stable.                                                                            

- Discharge Instructions: Dressing Change, Incision and Drainage, Care After.                     

                                                                                                  

- Medication Reconciliation Form, Thank You Letter, Antibiotic Education, Prescription            

  Opioid Use form.                                                                                

- Follow up: Mynor Manning MD; When: as scheduled in clinic for wound check; Reason:           

  Wound Recheck.                                                                                  

- Problem is new.                                                                                 

- Symptoms have improved.                                                                         

                                                                                                  

                                                                                                  

                                                                                                  

Signatures:                                                                                       

Aldair Bess MD MD cha Joaquin, Henry, RN                      RN   hj                                                   

Aldair Herrera PA                         PA   cp                                                   

Edyta Rapp, RN                       RN   tl3                                                  

                                                                                                  

Corrections: (The following items were deleted from the chart)                                    

                                                                                             

19:31 19:28 2018 19:28 Discharged to Home. Impression: Encounter for change or removal  tl3 

      of surgical wound dressing. Condition is Stable. Forms are Medication Reconciliation        

      Form, Thank You Letter, Antibiotic Education, Prescription Opioid Use. Follow up: Mynor Manning; When: as scheduled in clinic for wound check; Reason: Wound Recheck. Problem      

      is new. Symptoms have improved. cp                                                          

                                                                                                  

**************************************************************************************************

## 2018-06-18 NOTE — P.BOP
Preoperative diagnosis: perianal mass, possible fistula


Postoperative diagnosis: Infected complex infected pilonidal cyst


Primary procedure: 1. EUA 2. anoscopy, 3. rigid proctoscopy, 


Secondary procedure: 4. Wide excision of complex infected pilonidal cyst 10 x 6 

x 5 cm


Estimated blood loss: <20cc


Specimen: cyst


Findings: see dictation


Anesthesia: General


Drain(s): Other


Transferred to: Recovery Room


Condition: Good

## 2018-06-19 NOTE — DS
Date of Discharge:  06/18/2018



Diagnoses:  Perianal mass and infected complex pilonidal cyst.



Procedures:  EUA, anoscopy, proctoscopy, and wide excision of complex infected pilonidal cyst.



Disposition:  Home.



Activity:  As tolerated.  No heavy lifting.



Followup:  Follow up in my office in 1 week.  Call for appointment at [phone].  Patient will require
 wet-to-dry dressing with normal saline daily.



Medications:  Include Bactrim DS p.o. b.i.d. and Vicodin q.4 hours p.r.n. pain.





ROSMERY/ASHLEY

DD:  06/18/2018 14:24:04Voice ID:  [id number]

DT:  06/19/2018 01:53:18Report ID:  [id number]

## 2018-06-19 NOTE — OP
Date of Procedure:  06/18/2018



Surgeon:  Mynor Manning MD



Preoperative Diagnosis:  Perianal pain, possible fistula, and perianal mass.



Postoperative Diagnosis:  Infected complex pilonidal cyst.



Procedures:  Examination under anesthesia, anoscopy, rigid proctoscopy, and wide excision of complex 
infected pilonidal cyst 10 x 6 x 5 cm.



Specimens:  Cyst with hair.



Findings:  The patient has a perianal opening with discharge coming from the area and a mass, but whe
n we probed that area instead of coming to the rectum goes back where into the coccyx consistent with
 a pilonidal cyst.  Once the opening was investigated, we noticed a hair coming from the area consist
ent with a pilonidal cyst, so this has to be addressed.  I talked to the family outside and explained
 to them the new findings and the need for a wide excision of pilonidal infected cyst, and they gave 
me the consent.



Indications:  This is the case of a 17-year-old patient, comes to us with perianal mass, seen by the 
gastroenterologist recently, had a colonoscopy done.  The patient had some hemorrhoids, but he claime
d that a mass looked different consistent with possible fistula.  So, we offered him examination unde
r anesthesia, proctoscopy, anoscopy, possible fistulectomy with benefits, alternatives, and risks inc
luding, but are not limited to infection, bleeding, damage to adjacent structures, anesthesia complic
ation, anal stricture, anal incontinence, abscess, MI, or even death.  He also understands this may n
ot relieve any symptoms.  He might need more than one surgical intervention.  There is also a possibi
lity of perianal mass and it might need to be excised and may need wound care.  They understood, sign
ed a consent.  The area of concern was evaluated by me preoperatively and the patient.



Description Of Procedure:  The patient was brought to the operating room, placed in supine position. 
 Anesthesia was done without complication.  The patient was placed in left Trendelenburg position wit
h proper protection.  Rectal examination was done followed by rigid proctoscopy.  We did not see any 
masses in the area of the rectum.  Scope was removed.  Anoscope also shows there is no evidence of op
ening the area.  Once we probed the mass in the perianal region, we noticed this mass goes posteriorl
y into the area of the coccyx and finding a large cavity in that area.  We noticed that we dealing wi
th a complex pilonidal cyst with multiple openings including that one near the rectum.  There was no 
evidence of fistula.  This does not migraine into the rectum or even near deep rectum or proximal rec
aryan.  At that moment, then we discovered that we have a complex pilonidal cyst.  So, I went outside, 
talked to the patient's family, and I explained to them that we can just reorient and reposition him 
to the point that we can address a pilonidal cyst since he has infection and that will need to be exc
ised.  They gave me the consent.  They understands he will require wound care.  With the help of Ulices jones and the OR team, we were able to safely just rotate the patient and put him in prone position 
with proper protection.  The presacral area was prepped and draped in a sterile fashion.  Probe was p
laced into the opening and then, we noticed the area of the coccyx with many openings at this moment 
that are not draining, but it looked like at one point they were.  So when we went to the area, we ha
d this delineated by the Prolene and we proceeded to remove everything related to the pilonidal cyst.
  This led to a large cavity of 10 x 6 x 5 cm.  The cyst and the hair content, which is right in the 
coccyx, was removed.  Hemostasis was obtained and the area was packed with wet-to-dry dressing.  The 
patient tolerated the procedure well.  Sponge counts and instrument count were correct.  The patient 
sent to Recovery in stable condition.





ROSMERY/ASHLEY

DD:  06/18/2018 14:24:04Voice ID:  [id number]

DT:  06/19/2018 01:50:23Report ID:  [id number]
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
## 2021-11-15 NOTE — ER
Nurse's Notes                                                                                     

 [facility]                                                                 

Name: Duran Hodge                                                                         

Age: 21 yrs                                                                                       

Sex: Male                                                                                         

: 2000                                                                                   

MRN: [id number]                                                                                   

Arrival Date: 11/15/2021                                                                          

Time: 17:46                                                                                       

Account#: [id number]                                                                            

Bed 18                                                                                            

Private MD:                                                                                       

Diagnosis: Cutaneous abscess of groin-early                                                       

                                                                                                  

Presentation:                                                                                     

11/15                                                                                             

18:13 Chief complaint: Patient states: i got a lump on my LEFT groin area. i noticed 2 days   tw2 

      ago. it is just swollen. i was a pimple. i thought maybe it was an ant bite so i popped     

      it. so the next day it did it again so i popped it again and now it is just swelling.       

      Coronavirus screen: At this time, the client does not indicate any symptoms associated      

      with coronavirus-19. Ebola Screen: Patient denies travel to an Ebola-affected area in       

      the 21 days before illness onset. Onset of symptoms was November 15, 2021.                  

18:13 Method Of Arrival: Ambulatory                                                           tw2 

18:16 Initial Sepsis Screen: Does the patient meet any 2 criteria? No. Patient's initial      tw2 

      sepsis screen is negative. Does the patient have a suspected source of infection? No.       

      Patient's initial sepsis screen is negative. Risk Assessment: Do you want to hurt           

      yourself or someone else? Patient reports no desire to harm self or others.                 

18:16 Acuity: JESSICA 4                                                                           tw2 

18:16 Note pt returned to ER lobby at this time.                                              tw2 

                                                                                                  

Triage Assessment:                                                                                

18:14 General: Appears in no apparent distress. Behavior is calm, cooperative, appropriate    tw2 

      for age. Pain: Complains of pain in right groin.                                            

                                                                                                  

Historical:                                                                                       

- Allergies:                                                                                      

18:15 No Known Allergies;                                                                     tw2 

- Home Meds:                                                                                      

18:15 None [Active];                                                                          tw2 

- PMHx:                                                                                           

18:15 None;                                                                                   tw2 

- PSHx:                                                                                           

18:15 None; cyst on buttocks;                                                                 tw2 

                                                                                                  

- Immunization history:: Client reports receiving the 2nd dose of the Covid vaccine.              

- Social history:: Smoking status: Reported history of juuling and/or vaping.                     

- Family history:: not pertinent.                                                                 

                                                                                                  

                                                                                                  

Screenin:49 Abuse screen: Denies threats or abuse. Nutritional screening: No deficits noted.        tw2 

      Tuberculosis screening: No symptoms or risk factors identified. Fall Risk None              

      identified.                                                                                 

                                                                                                  

Assessment:                                                                                       

20:15 General: Appears uncomfortable, Behavior is calm, cooperative. Pain: Complains of pain  cc4 

      in shaft of penis and groin Pain radiates to groin left Pain currently is 8 out of 10       

      on a pain scale. at worst was 10 out of 10 on a pain scale. level that patient reports      

      is acceptable is 0 out of 10 on a pain scale. Quality of pain is described as tender,       

      throbbing, Pain began gradually, 2-3 days ago. Is continuous, Alleviated by rest,           

      Aggravated by increased activity. Neuro: No deficits noted. Level of Consciousness is       

      awake, alert, obeys commands, Oriented to person, place, time, situation.                   

20:15 General: medications given as ordered, chela. well.. Cardiovascular: No deficits noted.   cc4 

      Respiratory: No deficits noted. Airway is patent Respiratory effort is even, unlabored,     

      Respiratory pattern is regular, symmetrical. GI: No signs and/or symptoms were reported     

      involving the gastrointestinal system. : edema, pustule with redness noted of penis       

      with tenderness noted left groin. Derm: Skin has lesions on Abscess noted of penis.         

      Musculoskeletal: No deficits noted. Capillary refill < 3 seconds, Range of motion:          

      intact in all extremities.                                                                  

                                                                                                  

Vital Signs:                                                                                      

18:15  / 88; Pulse 85; Resp 18; Temp 98.6(TE); Pulse Ox 100% on R/A; Pain 3/10;         tw2 

20:30  / 92; Pulse 86; Resp 20; Temp 98.1; Pulse Ox 100% on R/A;                        cc4 

                                                                                                  

ED Course:                                                                                        

17:46 Patient arrived in ED.                                                                  mr  

18:14 Arm band placed on.                                                                     tw2 

18:16 Triage completed.                                                                       tw2 

19:31 Aldair Bess MD is Attending Physician.                                             amparo 

20:06 Ashtyn Funk, MANDO is Primary Nurse.                                                  cc4 

20:14 Rainer Nguyễn MD is Referral Physician.                                              amparo 

20:15 Patient has correct armband on for positive identification. Bed in low position. Call   cc4 

      light in reach. Side rails up X 1.                                                          

20:30 No provider procedures requiring assistance completed.                                  cc4 

20:30 Patient did not have IV access during this emergency room visit.                        cc4 

                                                                                                  

Administered Medications:                                                                         

20:15 Drug: Doxycycline 200 mg Route: PO;                                                     cc4 

20:30 Follow up: Response: No adverse reaction                                                cc4 

20:15 Drug: Bactrim (trimethoprim-sulfamethoxazole) (160 mg-800 mg (DS) 1 tablet Route: PO;   cc4 

20:30 Follow up: Response: No adverse reaction                                                cc4 

20:15 Drug: Motrin (ibuprofen) 600 mg Route: PO;                                              cc4 

20:30 Follow up: Response: No adverse reaction                                                cc4 

                                                                                                  

                                                                                                  

Outcome:                                                                                          

20:15 Discharge ordered by MD.                                                                amparo 

20:30 Discharged to home ambulatory.                                                          cc4 

20:30 Condition: stable                                                                           

20:30 Discharge instructions given to patient, Instructed on discharge instructions, follow       

      up and referral plans. medication usage, Demonstrated understanding of instructions,        

      follow-up care, medications.                                                                

20:49 Patient left the ED.                                                                    cc4 

                                                                                                  

Signatures:                                                                                       

Aldair Bess MD MD cha Rivera, Mary mr Wise, Tara RN                          RN   tw2                                                  

Ashtyn Funk, MANDO                    RN   cc4                                                  

                                                                                                  

**************************************************************************************************

## 2021-11-15 NOTE — EDPHYS
Physician Documentation                                                                           

 [facility]                                                                 

Name: Duran Hodge                                                                         

Age: 21 yrs                                                                                       

Sex: Male                                                                                         

: 2000                                                                                   

MRN: [id number]                                                                                   

Arrival Date: 11/15/2021                                                                          

Time: 17:46                                                                                       

Account#: [id number]                                                                            

Bed 18                                                                                            

Private MD:                                                                                       

ED Physician Aldair Bess                                                                      

HPI:                                                                                              

11/15                                                                                             

20:06 This 21 yrs old  Male presents to ER via Ambulatory with complaints of Groin   amparo 

      Pain.                                                                                       

20:06 The patient presents with cellulitis of the groin, the patient presents with a swollen  amparo 

      area of the shaft of penis. Description: The affected area is small, localized,             

      erythematous, fluctuant. Onset: The symptoms/episode began/occurred 3 day(s) ago.           

      Possible cause(s): unknown. Associated signs and symptoms: The patient has no apparent      

      associated signs or symptoms. Modifying factors: the symptoms are alleviated by             

      remaining still, the symptoms are aggravated by pressure, squeezing the lesion and          

      expressing the contents, touching. Severity of symptoms: At their worst the symptoms        

      were mild, moderate, in the emergency department the symptoms are unchanged. The            

      patient has not experienced similar symptoms in the past.                                   

                                                                                                  

Historical:                                                                                       

- Allergies:                                                                                      

18:15 No Known Allergies;                                                                     tw2 

- Home Meds:                                                                                      

18:15 None [Active];                                                                          tw2 

- PMHx:                                                                                           

18:15 None;                                                                                   tw2 

- PSHx:                                                                                           

18:15 None; cyst on buttocks;                                                                 tw2 

                                                                                                  

- Immunization history:: Client reports receiving the 2nd dose of the Covid vaccine.              

- Social history:: Smoking status: Reported history of juuling and/or vaping.                     

- Family history:: not pertinent.                                                                 

                                                                                                  

                                                                                                  

ROS:                                                                                              

20:06 Constitutional: Negative for fever, chills, and weight loss, Eyes: Negative for injury, amparo 

      pain, redness, and discharge, ENT: Negative for injury, pain, and discharge, Neck:          

      Negative for injury, pain, and swelling, Cardiovascular: Negative for chest pain,           

      palpitations, and edema, Respiratory: Negative for shortness of breath, cough,              

      wheezing, and pleuritic chest pain, Abdomen/GI: Negative for abdominal pain, nausea,        

      vomiting, diarrhea, and constipation, Back: Negative for injury and pain, MS/Extremity:     

      Negative for injury and deformity, Skin: Negative for injury, rash, and discoloration,      

      Neuro: Negative for headache, weakness, numbness, tingling, and seizure, Psych:             

      Negative for depression, anxiety, suicide ideation, homicidal ideation, and                 

      hallucinations, Allergy/Immunology: Negative for hives, rash, and allergies, Endocrine:     

      Negative for neck swelling, polydipsia, polyuria, polyphagia, and marked weight             

      changes, Hematologic/Lymphatic: Negative for swollen nodes, abnormal bleeding, and          

      unusual bruising.                                                                           

20:06 : Positive for penile pain, of the shaft of penis.                                        

                                                                                                  

Exam:                                                                                             

20:06 Constitutional:  This is a well developed, well nourished patient who is awake, alert,  amparo 

      and in no acute distress. Head/Face:  Normocephalic, atraumatic. Eyes:  Pupils equal        

      round and reactive to light, extra-ocular motions intact.  Lids and lashes normal.          

      Conjunctiva and sclera are non-icteric and not injected.  Cornea within normal limits.      

      Periorbital areas with no swelling, redness, or edema. ENT:  Nares patent. No nasal         

      discharge, no septal abnormalities noted.  Tympanic membranes are normal and external       

      auditory canals are clear.  Oropharynx with no redness, swelling, or masses, exudates,      

      or evidence of obstruction, uvula midline.  Mucous membranes moist. Neck:  Trachea          

      midline, no thyromegaly or masses palpated, and no cervical lymphadenopathy.  Supple,       

      full range of motion without nuchal rigidity, or vertebral point tenderness.  No            

      Meningismus. Chest/axilla:  Normal chest wall appearance and motion.  Nontender with no     

      deformity.  No lesions are appreciated. Cardiovascular:  Regular rate and rhythm with a     

      normal S1 and S2.  No gallops, murmurs, or rubs.  Normal PMI, no JVD.  No pulse             

      deficits. Respiratory:  Lungs have equal breath sounds bilaterally, clear to                

      auscultation and percussion.  No rales, rhonchi or wheezes noted.  No increased work of     

      breathing, no retractions or nasal flaring. Abdomen/GI:  Soft, non-tender, with normal      

      bowel sounds.  No distension or tympany.  No guarding or rebound.  No evidence of           

      tenderness throughout. Back:  No spinal tenderness.  No costovertebral tenderness.          

      Full range of motion. Skin:  Warm, dry with normal turgor.  Normal color with no            

      rashes, no lesions, and no evidence of cellulitis. MS/ Extremity:  Pulses equal, no         

      cyanosis.  Neurovascular intact.  Full, normal range of motion. Neuro:  Awake and           

      alert, GCS 15, oriented to person, place, time, and situation.  Cranial nerves II-XII       

      grossly intact.  Motor strength 5/5 in all extremities.  Sensory grossly intact.            

      Cerebellar exam normal.  Normal gait. Psych:  Awake, alert, with orientation to person,     

      place and time.  Behavior, mood, and affect are within normal limits.                       

20:06 : Male external genitalia: Circumcision noted. erythema, swelling: tenderness, is         

      palpated in the left inguinal area.                                                         

                                                                                                  

Vital Signs:                                                                                      

18:15  / 88; Pulse 85; Resp 18; Temp 98.6(TE); Pulse Ox 100% on R/A; Pain 3/10;         tw2 

20:30  / 92; Pulse 86; Resp 20; Temp 98.1; Pulse Ox 100% on R/A;                        cc4 

                                                                                                  

MDM:                                                                                              

19:31 Patient medically screened.                                                             amparo 

20:06 Differential diagnosis: abscess, allergic reaction, cellulitis, insect bite. Data       amparo 

      reviewed: vital signs, nurses notes. Data interpreted: Cardiac monitor: not applicable      

      for this patient encounter. rate is 85 beats/min, rhythm is regular, Pulse oximetry: on     

      room air is 100 %. Counseling: I had a detailed discussion with the patient and/or          

      guardian regarding: the historical points, exam findings, and any diagnostic results        

      supporting the discharge/admit diagnosis, the need for outpatient follow up, for            

      definitive care, a family practitioner, a urologist.                                        

                                                                                                  

Administered Medications:                                                                         

20:15 Drug: Doxycycline 200 mg Route: PO;                                                     cc4 

20:30 Follow up: Response: No adverse reaction                                                cc4 

20:15 Drug: Bactrim (trimethoprim-sulfamethoxazole) (160 mg-800 mg (DS) 1 tablet Route: PO;   cc4 

20:30 Follow up: Response: No adverse reaction                                                cc4 

20:15 Drug: Motrin (ibuprofen) 600 mg Route: PO;                                              cc4 

20:30 Follow up: Response: No adverse reaction                                                cc4 

                                                                                                  

                                                                                                  

Disposition Summary:                                                                              

11/15/21 20:15                                                                                    

Discharge Ordered                                                                                 

      Location: Home                                                                          amparo 

      Problem: new                                                                            amparo 

      Symptoms: have improved                                                                 amparo 

      Condition: Stable                                                                       amparo 

      Diagnosis                                                                                   

        - Cutaneous abscess of groin - early                                                  amparo 

      Followup:                                                                               amparo 

        - With: Private Physician                                                                  

        - When: 2 - 3 days                                                                         

        - Reason: Recheck today's complaints, Continuance of care, Re-evaluation by your           

      physician                                                                                   

      Followup:                                                                               amparo 

        - With: Rainer Nguyễn MD                                                                

        - When: 2 - 3 days                                                                         

        - Reason: Recheck today's complaints, Continuance of care, Re-evaluation by your           

      physician                                                                                   

      Discharge Instructions:                                                                     

        - Discharge Summary Sheet                                                             amparo 

        - Skin Abscess                                                                        amparo 

        - Skin Abscess, Easy-to-Read                                                          amparo 

        - Cellulitis, Adult                                                                   amparo 

        - Cellulitis, Adult, Easy-to-Read                                                     amparo 

      Forms:                                                                                      

        - Medication Reconciliation Form                                                      amapro 

        - Thank You Letter                                                                    amparo 

        - Antibiotic Education                                                                amparo 

        - Prescription Opioid Use                                                             [facility] [place] 

      Prescriptions:                                                                              

        - Ibuprofen 600 mg Oral Tablet                                                             

            - take 1 tablet by ORAL route every 6 hours As needed take with food; 30 tablet;  [facility] [place] 

      Refills: 0, Product Selection Permitted                                                     

        - Doxycycline Hyclate 100 mg Oral Tablet                                                   

            - take 1 tablet by ORAL route every 12 hours; 20 tablet; Refills: 0, Product      [facility] [place] 

      Selection Permitted                                                                         

        - Bactrim -160 mg Oral Tablet                                                        

            - take 1 tablet by ORAL route every 12 hours for 10 days; 20 tablet; Refills: 0,  [facility] [place] 

      Product Selection Permitted                                                                 

Signatures:                                                                                       

Aldair Bess MD MD cha Wise, Tara, RN                          RN   tw2                                                  

Ashtyn Funk RN                    RN   cc4                                                  

                                                                                                  

**************************************************************************************************

## 2021-11-15 NOTE — XMS REPORT
Continuity of Care Document

                          Created on:November 15, 2021



Patient:JUDY DOOLEY

Sex:Male

:2000

External Reference #:[id number]





Demographics







                          Address                   [address] [place] [address]

 

                          Home Phone                [phone]

 

                          Work Phone                [phone]

 

                          Email Address             [email or web address]

 

                          Preferred Language        Unknown

 

                          Marital Status            Unknown

 

                          Jew Affiliation     Unknown

 

                          Race                      Unknown

 

                          Additional Race(s)        Unavailable

 

                          Ethnic Group              Unknown









Author







                          Organization              [facility]

t

 

                          Address                   [address] [place] [address] [address]



                                                    [place] [address]

 

                          Phone                     [phone]









Care Team Providers







                    Name                Role                Phone

 

                    MALIKA Mcwilliams    Attending Clinician [phone]

 

                    MALIKA MERAZ        Attending Clinician Unavailable









Payers







           Payer Name Policy Type Policy Number Effective Date Expiration Date S

ource







Problems







       Condition Condition Condition Status Onset  Resolution Last   Treating Co

mments 

Source



       Name   Details Category        Date   Date   Treatment Clinician        



                                                 Date                 

 

       Elevated Elevated Disease Active 2020                             Unive

rs



       BP without BP without                                              it

y of



       diagnosis diagnosis               00:00:                             [place]

[place]



       of                                      Medical



       hypertensi hypertensi                                                  Br

anch



       on     on                                                      

 

       BMI    BMI    Disease Active 2020                             [facility]



       29.0-29.9, 29.0-29.9,                                              it

y of



       adult  adult                00:00:                             [address]



                                                                      Branch

 

       STD    STD    Disease Active 2020                             [facility]



       exposure exposure                                              ity of



                                   00:00:                             [address]







Allergies, Adverse Reactions, Alerts







       Allergy Allergy Status Severity Reaction(s) Onset  Inactive Treating Comm

ents 

Source



       Name   Type                        Date   Date   Clinician        

 

       NO KNOWN Drug   Active                                           [facility]



       ALLERGIE Class                                                   ity of



       S                                                              [facility]







Social History







           Social Habit Start Date Stop Date  Quantity   Comments   Source

 

           Sex Assigned At Birth                                             Uni

[facility]

 

           Exposure to SARS-CoV-2                       Not sure              [facility]

[facility] [place]



           (event)                                                [facility]









                Smoking Status  Start Date      Stop Date       Source

 

                Unknown if ever smoked                                 [facility]

y [facility]







Medications







       Ordered Filled Start  Stop   Current Ordering Indication Dosage Frequency

 Signature

                    Comments            Components          Source



     Medication Medication Date Date Medication? Clinician                (SIG) 

          



     Name Name                                                   

 

     cefTRIAXone      2020 2020- No             250mg                     Univ

ers



     (ROCEPHIN)                                               ity of



     injection      21:00: 08:59                                         [place]



     250 mg      00   :00                                          Medical



                                                                 Branch

 

     cefTRIAXone      2020 2020- No             250mg                     Univ

ers



     (ROCEPHIN)                                               ity of



     injection      21:00: 22:26                                         [place]



     250 mg      00   :00                                          Medical



                                                                 Branch

 

     cefTRIAXone      2020- No             250mg      250 mg,           U

nivers



     (ROCEPHIN)                                Intramuscu           it

y of



     injection      21:00: 22:26                          lar, ONCE,           T

exas



     250 mg      00   :00                           1 dose,           [facility]



                                                  11/3/20 at           



                                                  1500,           



                                                  ASAP<br>Re           



                                                  ason for           



                                                  Anti-Infec           



                                                  tive:           



                                                  Documented           



                                                  Infection<           



                                                  br>Documen           



                                                  micheal            



                                                  Infection           



                                                  Site:           



                                                  Pelvic<br>           



                                                  Duration           



                                                  of             



                                                  Therapy:           



                                                  Other (see           



                                                  Comments)           

 

     cefTRIAXone      2020- No             250mg                     Univ

ers



     (ROCEPHIN)                                               ity of



     injection      21:00: 22:26                                         [place]



     250 mg      00   :00                                          Medical



                                                                 Branch

 

     cefTRIAXone      2020- No             250mg      250 mg,           U

nivers



     (ROCEPHIN)                                Intramuscu           it

y of



     injection      21:00: 22:26                          lar, ONCE,           T

exas



     250 mg      00   :00                           1 dose,           [facility]



                                                  11/3/20 at           



                                                  1500,           



                                                  ASAP<br>Re           



                                                  ason for           



                                                  Anti-Infec           



                                                  tive:           



                                                  Documented           



                                                  Infection<           



                                                  br>Documen           



                                                  micheal            



                                                  Infection           



                                                  Site:           



                                                  Pelvic<br>           



                                                  Duration           



                                                  of             



                                                  Therapy:           



                                                  Other (see           



                                                  Comments)           

 

     azithromyci      2020- No        [id number] 1000mg      Take 2       

    [facility]



     n                                   tablets by           ity of



     (ZITHROMAX)      00:00: 05:59                          mouth once          

 [place]



     500 mg      00   :00                           now for 1           Medical



     tablet                                         dose.           Branch

 

     azithromyci      2020- No        [id number] 1000mg      Take 2       

    [facility]



     n                                   tablets by           ity of



     (ZITHROMAX)      00:00: 05:59                          mouth once          

 [place]



     500 mg      00   :00                           now for 1           Medical



     tablet                                         dose.           Branch

 

     azithromyci      2020- No        [id number] 1000mg      Take 2       

    [facility]



     n                                   tablets by           ity of



     (ZITHROMAX)      00:00: 05:59                          mouth once          

 [place]



     500 mg      00   :00                           now for 1           Medical



     tablet                                         dose.           Branch

 

     No known                No                                      [facility]



     medications                                                        ity of



                                                                 [facility]







Vital Signs







             Vital Name   Observation Time Observation Value Comments     Source

 

             Systolic blood 2020 19:45:00 139 mm[Hg]                Univer

sity of



             pressure                                            [facility]

 

             Diastolic blood 2020 19:45:00 76 mm[Hg]                 Unive

rsity of



             pressure                                            [facility]

 

             Heart rate   2020 19:32:00 79 /min                   [facility]

 

             Body temperature 2020 19:32:00 36.39 [facility]                 [facility]

 

             Res[facility] rate 2020 19:32:00 16 /min                   [facility]

 

             Body height  2020 19:32:00 172.7 cm                  [facility]

 

             Body weight  2020 19:32:00 88.769 kg                 [facility]

 

             BMI          2020 19:32:00 29.76 kg/m2               [facility]







Procedures

This patient has no known procedures.



Encounters







        Start   End     Encounter Admission Attending Care    Care    Encounter 

Source



        Date/Time Date/Time Type    Type    Clinicians Facility Department ID   

   

 

        2020 Telephone         Mariya [facility]    [phone] [id number]

[id number] [facility]



        00:00:00 00:00:00                 Jaqueline DALY [facility]  [id number]         it

y of



                                                REGIONAL [id number]         Mt

as



                                                MATERNAL [id number]         Med

ical



                                                & CHILD [address] [place] [address]                 

 

        2020 Office          Mariya [facility]    [id number]

[id number] Univers



        13:19:49 13:56:35 Visit           Jaqueline DALY OB/GYN  [id number]         it

y of



                                                REGIONAL [id number]         Mt

as



                                                MATERNAL [id number]         Med

ical



                                                & [address]                 

 

        2020 Outpatient R       MARIYA [facility]    [id number]

[id number] Univers



        13:15:00 13:56:35                 JAQUELINE mccain [facility]

 

        2020 Outpatient R       MARIYA [facility]    [id number]

[id number] Univers



        13:15:00 13:15:00                 JAQUELINE mccain [facility]

 

        2020 [facility]    [id number]

[id number] Univers



        00:00:00 00:00:00 (Out)           Jaqueline DALY OB/GYN  [id number]         it

y of



                                                REGIONAL [id number]         Mt

as



                                                MATERNAL [id number]         Med

ical



                                                & CHILD [address] [place] [address]                 

 

        2020-10-21 2020-10-21 Outpatient R               [facility]    [id number]

-20 Univers



        14:45:00 14:45:00                                         2010  ity of



                                                                        [facility]







Results

This patient has no known results.

## 2022-10-19 ENCOUNTER — HOSPITAL ENCOUNTER (EMERGENCY)
Dept: HOSPITAL 97 - ER | Age: 22
Discharge: HOME | End: 2022-10-19
Payer: COMMERCIAL

## 2022-10-19 VITALS — OXYGEN SATURATION: 99 % | DIASTOLIC BLOOD PRESSURE: 82 MMHG | TEMPERATURE: 97.7 F | SYSTOLIC BLOOD PRESSURE: 134 MMHG

## 2022-10-19 DIAGNOSIS — S09.90XA: Primary | ICD-10-CM

## 2022-10-19 DIAGNOSIS — S00.83XA: ICD-10-CM

## 2022-10-19 PROCEDURE — 99283 EMERGENCY DEPT VISIT LOW MDM: CPT

## 2022-10-19 PROCEDURE — 70486 CT MAXILLOFACIAL W/O DYE: CPT

## 2022-10-19 PROCEDURE — 70450 CT HEAD/BRAIN W/O DYE: CPT

## 2022-10-19 PROCEDURE — 72125 CT NECK SPINE W/O DYE: CPT

## 2022-10-19 PROCEDURE — 76377 3D RENDER W/INTRP POSTPROCES: CPT

## 2022-10-19 NOTE — ER
Nurse's Notes                                                                                     

 [facility]                                                                 

Name: Duran Hodge                                                                         

Age: 22 yrs                                                                                       

Sex: Male                                                                                         

: 2000                                                                                   

MRN: [id number]                                                                                   

Arrival Date: 10/19/2022                                                                          

Time: 09:35                                                                                       

Account#: [id number]                                                                            

Bed 12                                                                                            

Private MD:                                                                                       

Diagnosis: Unspecified injury of head, initial encounter;Contusion of unspecified part of         

  head-face , jaw                                                                                 

                                                                                                  

Presentation:                                                                                     

10/19                                                                                             

10:04 Chief complaint: Patient states: Underneath a truck two days ago. Trying to loosen jillian ld1 

      - jillian fell down fast and the truck hit top of head and my chin hit the concrete. C/O       

      pain/pressure in top of head and jaw discomfort "[place] a pop in jaw.". Care prior to        

      arrival: None. Mechanism of Injury:.                                                        

10:04 Acuity: JESSICA 3                                                                           ld1 

10:04 Method Of Arrival: Ambulatory                                                           ld1 

10:08 Coronavirus screen: At this time, the client does not indicate any symptoms associated  ld1 

      with coronavirus-19. Ebola Screen: No symptoms or risks identified at this time.            

      Initial Sepsis Screen: Does the patient meet any 2 criteria? No. Patient's initial          

      sepsis screen is negative. Does the patient have a suspected source of infection? No.       

      Patient's initial sepsis screen is negative. Risk Assessment: Do you want to hurt           

      yourself or someone else? Patient reports no desire to harm self or others. Onset of        

      symptoms was 2022.                                                              

                                                                                                  

Triage Assessment:                                                                                

10:06 General: Appears in no apparent distress. comfortable, Behavior is calm, cooperative,   ld1 

      appropriate for age. Pain: Complains of pain in face Pain does not radiate. Pain            

      currently is 1 out of 10 on a pain scale. at worst was 8 out of 10 on a pain scale.         

      Quality of pain is described as pressure, throbbing. EENT: No signs and/or symptoms         

      were reported regarding the EENT system. Neuro: Level of Consciousness is awake, alert,     

      obeys commands, Oriented to person, place, time, situation. Cardiovascular: Capillary       

      refill < 3 seconds Patient's skin is warm and dry. Respiratory: Airway is patent            

      Respiratory effort is even, unlabored. GI: Abdomen is flat, non-distended. : No signs     

      and/or symptoms were reported regarding the genitourinary system. Derm: No signs and/or     

      symptoms reported regarding the dermatologic system. Musculoskeletal: No signs and/or       

      symptoms reported regarding the musculoskeletal system.                                     

                                                                                                  

Historical:                                                                                       

- Allergies:                                                                                      

10:06 No Known Allergies;                                                                     ld1 

- Home Meds:                                                                                      

10:06 None [Active];                                                                          ld1 

- PMHx:                                                                                           

10:06 None;                                                                                   ld1 

- PSHx:                                                                                           

10:06 cyst on buttocks;                                                                       ld1 

                                                                                                  

- Immunization history:: Adult Immunizations up to date, Client reports receiving the             

  2nd dose of the Covid vaccine.                                                                  

- Social history:: Smoking status: Patient denies any tobacco usage or history of.                

  Patient/guardian denies using alcohol.                                                          

- Family history:: not pertinent.                                                                 

                                                                                                  

                                                                                                  

Screening:                                                                                        

10:08 Abuse screen: Denies threats or abuse. Denies injuries from another. Abuse screen:      ss  

      Denies threats or abuse. Denies injuries from another. Nutritional screening: No            

      deficits noted. Tuberculosis screening: Never had TB. Fall Risk None identified.            

                                                                                                  

Assessment:                                                                                       

10:08 General: Appears in no apparent distress. comfortable, Behavior is calm, cooperative,   ss  

      Denies fever, feeling ill, fatigue, chills. Neuro: Level of Consciousness is awake,         

      alert, obeys commands, Oriented to person, place, time, situation. Cardiovascular:          

      Capillary refill < 3 seconds is brisk in bilateral fingers Patient's skin is warm and       

      dry. Respiratory: Airway is patent Respiratory effort is even, unlabored, Respiratory       

      pattern is regular, symmetrical. Derm: Skin is intact, is healthy with good turgor,         

      Skin is dry, Skin is pink, warm \T\ dry. normal.                                            

                                                                                                  

Vital Signs:                                                                                      

10:06  / 82; Pulse 93; Resp 18; Temp 97.7(TE); Pulse Ox 99% on R/A; Weight 88.45 kg;    ld1 

      Height 5 ft. 10 in. (177.80 cm); Pain 1/10;                                                 

10:06 Body Mass Index 27.98 (88.45 kg, 177.80 cm)                                             ld1 

                                                                                                  

Maynard Coma Score:                                                                               

11:00 Eye Response: spontaneous(4). Verbal Response: oriented(5). Motor Response: obeys       amparo 

      commands(6). Total: 15.                                                                     

11:04 Eye Response: spontaneous(4). Verbal Response: oriented(5). Motor Response: obeys       amparo 

      commands(6). Total: 15.                                                                     

                                                                                                  

ED Course:                                                                                        

09:35 Patient arrived in ED.                                                                  as  

09:39 Aldair Bess MD is Attending Physician.                                             [facility] 

10:06 Triage completed.                                                                       ld1 

10:06 Arm band placed on right wrist.                                                         ld1 

10:08 Patient has correct armband on for positive identification. Bed in low position. Call   ss  

      light in reach.                                                                             

11:08 CT Head C Spine In Process Unspecified.                                                 EDMS

11:08 CT Facial Bones W/O Con In Process Unspecified.                                         EDMS

11:54 Mabel Chaney, RN is Primary Nurse.                                                    ss  

11:55 No provider procedures requiring assistance completed. Patient did not have IV access   ss  

      during this emergency room visit.                                                           

                                                                                                  

Administered Medications:                                                                         

11:55 Not Given (Pt left medications at bedsidee): Tylenol 1000 mg PO once                    ss  

                                                                                                  

                                                                                                  

Medication:                                                                                       

10:08 VIS not applicable for this client.                                                     ss  

                                                                                                  

Outcome:                                                                                          

11:42 Discharge ordered by MD.                                                                [facility] 

11:55 Discharged to home ambulatory.                                                          ss  

11:55 Condition: good                                                                             

11:55 Discharge instructions given to patient, family, Instructed on discharge instructions,      

      follow up and referral plans. medication usage, Demonstrated understanding of               

      instructions, follow-up care, medications, Prescriptions given X 1.                         

11:57 Patient left the ED.                                                                    ss  

                                                                                                  

Signatures:                                                                                       

Dispatcher [employer]                           EDMS                                                 

Aldair Bess MD MD cha Martinez, Amelia                             as                                                   

Mabel Chaney, RN                      RN                                                      

Jennifer Gregory, RN                     RN   ld1                                                  

                                                                                                  

**************************************************************************************************

## 2022-10-19 NOTE — XMS REPORT
Continuity of Care Document

                           Created on:2022



Patient:JUDY DOOLEY

Sex:Male

:2000

External Reference #:[id number]





Demographics







                          Address                   [address] [place] [address]

 

                          Home Phone                [phone]

 

                          Work Phone                [phone]

 

                          Email Address             [email or web address]

 

                          Preferred Language        Unknown

 

                          Marital Status            Unknown

 

                          Mosque Affiliation     Unknown

 

                          Race                      Unknown

 

                          Additional Race(s)        Unavailable

 

                          Ethnic Group              Unknown









Author







                          Organization              [facility]

t

 

                          Address                   [address]



                                                    [place] [address]

 

                          Phone                     [phone]









Care Team Providers







                    Name                Role                Phone

 

                    Jaqueline Mcwilliams Attending Clinician [phone]

 

                    JAQUELINE MERAZ   Attending Clinician Unavailable









Payers







           Payer Name Policy Type Policy Number Effective Date Expiration Date S

ource







Problems







       Condition Condition Condition Status Onset  Resolution Last   Treating Co

mments 

Source



       Name   Details Category        Date   Date   Treatment Clinician        



                                                 Date                 

 

       Elevated Elevated Disease Active 2020                             Unive

rs



       BP without BP without                                              it

y of



       diagnosis diagnosis               00:00:                             [place]

[place]



       of                                      Medical



       hypertensi hypertensi                                                  Br

anch



       on     on                                                      

 

       BMI    BMI    Disease Active 2020                             [facility]



       29.0-29.9, 29.0-29.9,                                              it

y of



       adult  adult                00:00:                             [address]

 

       STD    STD    Disease Active 2020                             [facility]



       exposure exposure                                              ity of



                                   00:00:                             [address]







Allergies, Adverse Reactions, Alerts







       Allergy Allergy Status Severity Reaction(s) Onset  Inactive Treating Comm

ents 

Source



       Name   Type                        Date   Date   Clinician        

 

       NO KNOWN Drug   Active                                           [facility]



       ALLERGIE Class                                                   ity of



       S                                                              [facility]







Social History







           Social Habit Start Date Stop Date  Quantity   Comments   Source

 

           Sex Assigned At Birth                                             Uni

versity [facility]

 

           Exposure to SARS-CoV-2                       Not sure              [facility]

[facility] [place]



           (event)                                                [facility]









                Smoking Status  Start Date      Stop Date       Source

 

                Unknown if ever smoked                                 [facility]

y [facility]







Medications







       Ordered Filled Start  Stop   Current Ordering Indication Dosage Frequency

 Signature

                    Comments            Components          Source



     Medication Medication Date Date Medication? Clinician                (SIG) 

          



     Name Name                                                   

 

     cefTRIAXone      2020- No             250mg                     Univ

ers



     (ROCEPHIN)                                               ity of



     injection      21:00: 08:59                                         [place]



     250 mg      00   :00                                          Medical



                                                                 Branch

 

     cefTRIAXone      2020- No             250mg                     Univ

ers



     (ROCEPHIN)                                               ity of



     injection      21:00: 22:26                                         [place]



     250 mg      00   :00                                          Medical



                                                                 Branch

 

     cefTRIAXone      2020- No             250mg      250 mg,           U

nivers



     (ROCEPHIN)                                Intramuscu           it

y of



     injection      21:00: 22:26                          lar, ONCE,           T

exas



     250 mg      00   :00                           1 dose,           [facility]



                                                  11/3/20 at           



                                                  1500,           



                                                  ASAP<br>Re           



                                                  ason for           



                                                  Anti-Infec           



                                                  tive:           



                                                  Documented           



                                                  Infection<           



                                                  br>Documen           



                                                  micheal            



                                                  Infection           



                                                  Site:           



                                                  Pelvic<br>           



                                                  Duration           



                                                  of             



                                                  Therapy:           



                                                  Other (see           



                                                  Comments)           

 

     cefTRIAXone      2020- No             250mg                     Univ

ers



     (ROCEPHIN)                                               ity of



     injection      21:00: 22:26                                         [place]



     250 mg      00   :00                                          Medical



                                                                 Branch

 

     cefTRIAXone      2020- No             250mg      250 mg,           U

nivers



     (ROCEPHIN)                                Intramuscu           it

y of



     injection      21:00: 22:26                          lar, ONCE,           T

exas



     250 mg      00   :00                           1 dose,           [facility]



                                                  11/3/20 at           



                                                  1500,           



                                                  ASAP<br>Re           



                                                  ason for           



                                                  Anti-Infec           



                                                  tive:           



                                                  Documented           



                                                  Infection<           



                                                  br>Documen           



                                                  micheal            



                                                  Infection           



                                                  Site:           



                                                  Pelvic<br>           



                                                  Duration           



                                                  of             



                                                  Therapy:           



                                                  Other (see           



                                                  Comments)           

 

     azithromyci      2020- No        [id number] 1000mg      Take 2       

    [facility]



     n                                   tablets by           ity of



     (ZITHROMAX)      00:00: 05:59                          mouth once          

 [place]



     500 mg      00   :00                           now for 1           Medical



     tablet                                         dose.           Branch

 

     azithromyci      2020- No        [id number] 1000mg      Take 2       

    [facility]



     n                                   tablets by           ity of



     (ZITHROMAX)      00:00: 05:59                          mouth once          

 [place]



     500 mg      00   :00                           now for 1           Medical



     tablet                                         dose.           Branch

 

     azithromyci      2020- No        [id number] 1000mg      Take 2       

    [facility]



     n                                   tablets by           ity of



     (ZITHROMAX)      00:00: 05:59                          mouth once          

 [place]



     500 mg      00   :00                           now for 1           Medical



     tablet                                         dose.           Branch

 

     No known                No                                      [facility]



     medications                                                        ity of



                                                                 [facility]







Vital Signs







             Vital Name   Observation Time Observation Value Comments     Source

 

             Systolic blood 2020 19:45:00 139 mm[Hg]                Univer

sity of



             pressure                                            [facility]

 

             Diastolic blood 2020 19:45:00 76 mm[Hg]                 Unive

rsity of



             pressure                                            [facility]

 

             Heart rate   2020 19:32:00 79 /min                   [facility]

 

             Body temperature 2020 19:32:00 36.39 [facility]                 [facility]

 

             Respiratory rate 2020 19:32:00 16 /min                   [facility]

 

             Body height  2020 19:32:00 172.7 cm                  [facility]

 

             Body weight  2020 19:32:00 88.769 kg                 [facility]

 

             BMI          2020 19:32:00 29.76 kg/m2               [facility]







Procedures

This patient has no known procedures.



Encounters







        Start   End     Encounter Admission Attending Care    Care    Encounter 

Source



        Date/Time Date/Time Type    Type    Clinicians Facility Department ID   

   

 

        2020 [facility]    [phone]

[id number] [facility]



        00:00:00 00:00:00                 Jaqueline DALY OB/GYN  [id number]         it

y of



                                                REGIONAL [id number]         Mt

as



                                                MATERNAL [id number]         Med

[facility]



                                                & CHILD [address] [place] [address]                 

 

        2020 [facility]          [facility]    [phone] [id number]

[id number] Univers



        13:19:49 13:56:35 Visit           Jaqueline DALY OB/GYN  [id number]         it

y of



                                                REGIONAL [id number]         Mt

as



                                                MATERNAL [id number]         Med

ical



                                                & [address]                 

 

        2020 Outpatient R       MARIYA [facility]    [id number]

[id number] Univers



        13:15:00 13:56:35                 JAQUELINE mccain [facility]

 

        2020 Outpatient R       MARIYA [facility]    [id number]

[id number] Univers



        13:15:00 13:15:00                 JAQUELINE mccain [facility]

 

        2020 Letter          [facility]    [phone] [id number]

[id number] Univers



        00:00:00 00:00:00 (Out)           Jaqueline DALY OB/GYN  [id number]         it

y of



                                                REGIONAL [id number]         Mt

as



                                                MATERNAL [id number]         Med

ical



                                                & CHILD [address] [place] [address]                 

 

        2020-10-21 2020-10-21 Outpatient R               [facility]    [id number]

-20 Univers



        14:45:00 14:45:00                                         [facility]







Results

This patient has no known results.

## 2022-10-19 NOTE — RAD REPORT
EXAM DESCRIPTION:  CT - [id number] - 10/19/2022 11:07 am

 

CLINICAL HISTORY:  head injury

 

COMPARISON:  No comparisons

 

TECHNIQUE:  Axial 5 mm thick images of the head were obtained.  Axial 2 mm thick images of the cervic
al spine were obtained with sagittal and coronal reconstruction images generated and reviewed.

 

All CT scans are performed using dose optimization technique as appropriate and may include automated
 exposure control or mA/KV adjustment according to patient size.

 

FINDINGS:  No intracranial hemorrhage, mass, edema or acute intracranial finding. No cortical edema o
r sulcal effacement. Ventricles are normal. No extra-axial fluid collections. Mastoid air cells are c
lear. Facial bones, orbits and sinuses are separately detailed.

 

Cervical body height and alignment are normal. No disk space narrowing. No fracture or acute bony abn
ormality.  Central canal detail is inherently limited.

 

No paraspinal mass or hematoma.

 

IMPRESSION:  Negative CT head examination for acute or significant finding.

 

Negative CT cervical spine examination for acute or significant finding.

 

Facial bones, orbits and sinuses are separately detailed.

## 2022-10-19 NOTE — EDPHYS
Physician Documentation                                                                           

 [facility]                                                                 

Name: Duran Hodge                                                                         

Age: 22 yrs                                                                                       

Sex: Male                                                                                         

: 2000                                                                                   

MRN: [id number]                                                                                   

Arrival Date: 10/19/2022                                                                          

Time: 09:35                                                                                       

Account#: [id number]                                                                            

Bed 12                                                                                            

Private MD:                                                                                       

ED Physician Aldair Bess                                                                      

HPI:                                                                                              

10/19                                                                                             

11:00 This 22 yrs old  Male presents to ER via Ambulatory with complaints of Head    amparo 

      Injury Without LOC-Adult, Jaw Injury.                                                       

11:00 The patient or guardian reports injury, swelling, tenderness. The complaints affect the amparo 

      top of head, left frontal area and right frontal area. Context of injury: The problem       

      was sustained outdoors, resulted from a direct blow, jack broke,slipped. Onset: The         

      symptoms/episode began/occurred 2 day(s) ago. Associated signs and symptoms: Loss of        

      consciousness: This patient did not experience any loss of consciousness. Pertinent         

      positives: headache. Severity of symptoms: At their worst the symptoms were mild, in        

      the emergency department the symptoms are unchanged. The patient has not experienced        

      similar symptoms in the past.                                                               

                                                                                                  

Historical:                                                                                       

- Allergies:                                                                                      

10:06 No Known Allergies;                                                                     ld1 

- Home Meds:                                                                                      

10:06 None [Active];                                                                          ld1 

- PMHx:                                                                                           

10:06 None;                                                                                   ld1 

- PSHx:                                                                                           

10:06 cyst on buttocks;                                                                       ld1 

                                                                                                  

- Immunization history:: Adult Immunizations up to date, Client reports receiving the             

  2nd dose of the Covid vaccine.                                                                  

- Social history:: Smoking status: Patient denies any tobacco usage or history of.                

  Patient/guardian denies using alcohol.                                                          

- Family history:: not pertinent.                                                                 

                                                                                                  

                                                                                                  

ROS:                                                                                              

11:00 Constitutional: Negative for fever, chills, and weight loss, Eyes: Negative for injury, amparo 

      pain, redness, and discharge, Neck: Negative for injury, pain, and swelling,                

      Cardiovascular: Negative for chest pain, palpitations, and edema, Respiratory: Negative     

      for shortness of breath, cough, wheezing, and pleuritic chest pain, Abdomen/GI:             

      Negative for abdominal pain, nausea, vomiting, diarrhea, and constipation, Back:            

      Negative for injury and pain, : Negative for injury, bleeding, discharge, and             

      swelling, MS/Extremity: Negative for injury and deformity, Skin: Negative for injury,       

      rash, and discoloration, Psych: Negative for depression, anxiety, suicide ideation,         

      homicidal ideation, and hallucinations, Allergy/Immunology: Negative for hives, rash,       

      and allergies, Endocrine: Negative for neck swelling, polydipsia, polyuria, polyphagia,     

      and marked weight changes, Hematologic/Lymphatic: Negative for swollen nodes, abnormal      

      bleeding, and unusual bruising.                                                             

11:00 ENT: Positive for mandible pain.                                                            

                                                                                                  

Exam:                                                                                             

11:00 Constitutional:  This is a well developed, well nourished patient who is awake, alert,  amparo 

      and in no acute distress. Eyes:  Pupils equal round and reactive to light, extra-ocular     

      motions intact.  Lids and lashes normal.  Conjunctiva and sclera are non-icteric and        

      not injected.  Cornea within normal limits.  Periorbital areas with no swelling,            

      redness, or edema. Neck:  Trachea midline, no thyromegaly or masses palpated, and no        

      cervical lymphadenopathy.  Supple, full range of motion without nuchal rigidity, or         

      vertebral point tenderness.  No Meningismus. Chest/axilla:  Normal chest wall               

      appearance and motion.  Nontender with no deformity.  No lesions are appreciated.           

      Cardiovascular:  Regular rate and rhythm with a normal S1 and S2.  No gallops, murmurs,     

      or rubs.  Normal PMI, no JVD.  No pulse deficits. Respiratory:  Lungs have equal breath     

      sounds bilaterally, clear to auscultation and percussion.  No rales, rhonchi or wheezes     

      noted.  No increased work of breathing, no retractions or nasal flaring. Abdomen/GI:        

      Soft, non-tender, with normal bowel sounds.  No distension or tympany.  No guarding or      

      rebound.  No evidence of tenderness throughout. Back:  No spinal tenderness.  No            

      costovertebral tenderness.  Full range of motion. Male :  Normal genitalia with no        

      discharge or lesions. Skin:  Warm, dry with normal turgor.  Normal color with no            

      rashes, no lesions, and no evidence of cellulitis. MS/ Extremity:  Pulses equal, no         

      cyanosis.  Neurovascular intact.  Full, normal range of motion. Neuro:  Awake and           

      alert, GCS 15, oriented to person, place, time, and situation.  Cranial nerves II-XII       

      grossly intact.  Motor strength 5/5 in all extremities.  Sensory grossly intact.            

      Cerebellar exam normal.  Normal gait. Psych:  Awake, alert, with orientation to person,     

      place and time.  Behavior, mood, and affect are within normal limits.                       

11:00 Head/face: Noted is swelling, that is mild.                                                 

11:00 Neuro: Orientation: is normal, appropriate for stated age, no acute changes, Mentation:     

      is normal, appropriate for stated age, no acute changes, Memory: is normal, appropriate     

      for stated age, no acute changes, Cranial nerves: grossly normal, is grossly normal         

      based on the patient's age, no acute changes, Cerebellar function: is grossly normal,       

      is grossly normal based on the patient's age, no acute changes, Motor: is normal, is        

      grossly normal based on the patient's age, moves all fours, strength is normal,             

      Sensation: is normal, appropriate  no acute changes, Gait: not applicable Deep tendon       

      reflexes are 2+ (normal) in the  bilateral brachioradialis, bicep, tricep and patellar      

      and Achilles tendons, Babinski testing is normal, seizure activity, is not displayed by     

      the patient.                                                                                

                                                                                                  

Vital Signs:                                                                                      

10:06  / 82; Pulse 93; Resp 18; Temp 97.7(TE); Pulse Ox 99% on R/A; Weight 88.45 kg;    ld1 

      Height 5 ft. 10 in. (177.80 cm); Pain 1/10;                                                 

10:06 Body Mass Index 27.98 (88.45 kg, 177.80 cm)                                             ld1 

                                                                                                  

[place] Coma Score:                                                                               

11:00 Eye Response: spontaneous(4). Verbal Response: oriented(5). Motor Response: obeys       amparo 

      commands(6). Total: 15.                                                                     

11:04 Eye Response: spontaneous(4). Verbal Response: oriented(5). Motor Response: obeys       amparo 

      commands(6). Total: 15.                                                                     

                                                                                                  

MDM:                                                                                              

09:39 Patient medically screened.                                                             amparo 

11:04 Differential diagnosis: Contusion of Hematoma on Intracranial bleed- Concussion without amparo 

      LOC. cerebral contusion. Data reviewed: vital signs, nurses notes. Data interpreted:        

      Cardiac monitor: rate is 93 beats/min, rhythm is regular, Pulse oximetry: on room air       

      is 99 %. Counseling: I had a detailed discussion with the patient and/or guardian           

      regarding: the historical points, exam findings, and any diagnostic results supporting      

      the discharge/admit diagnosis, lab results, radiology results.                              

                                                                                                  

10/19                                                                                             

10:55 Order name: CT Head C Spine; Complete Time: 11:42                                       amparo 

10/19                                                                                             

10:55 Order name: CT Facial Bones W/O Con; Complete Time: 11:42                               amparo 

                                                                                                  

Administered Medications:                                                                         

11:55 Not Given (Pt left medications at bedsidee): Tylenol 1000 mg PO once                    ss  

                                                                                                  

                                                                                                  

Disposition Summary:                                                                              

10/19/22 11:42                                                                                    

Discharge Ordered                                                                                 

      Location: Home                                                                          [facility] 

      Problem: new                                                                            amparo 

      Symptoms: have improved                                                                 amparo 

      Condition: Stable                                                                       amparo 

      Diagnosis                                                                                   

        - Unspecified injury of head, initial encounter                                       amparo 

        - Contusion of unspecified part of head - face , jaw                                  amparo 

      Followup:                                                                               amparo 

        - With: Private Physician                                                                  

        - When: 2 - 3 days                                                                         

        - Reason: Recheck today's complaints, Continuance of care, Re-evaluation by your           

      physician                                                                                   

      Discharge Instructions:                                                                     

        - Discharge Summary Sheet                                                             amparo 

        - Contusion                                                                           amparo 

        - Facial or Scalp Contusion                                                           amparo 

        - Head Injury, Adult                                                                  amparo 

        - Jaw Contusion                                                                       amparo 

        - Contusion, Easy-to-Read                                                             amparo 

        - Head Injury, Adult, Easy-to-Read                                                    amparo 

        - Facial or Scalp Contusion, Easy-to-Read                                             amparo 

        - Jaw Contusion, Easy-to-Read                                                         amparo 

      Forms:                                                                                      

        - Medication Reconciliation Form                                                      amparo 

        - Thank You Letter                                                                    amparo 

        - Antibiotic Education                                                                amparo 

        - Work release form                                                                   bd  

        - Prescription Opioid Use                                                             [facility] 

      Prescriptions:                                                                              

        - Ibuprofen 600 mg Oral Tablet                                                             

            - take 1 tablet by ORAL route every 6 hours As needed take with food; 20 tablet;  [facility] 

      Refills: 0, Product Selection Permitted                                                     

Signatures:                                                                                       

Dispatcher [employer]                           EDAldair Suarez MD MD cha Dibbern, Lauren, RN                     RN   ld1                                                  

Mabel Chaney RN   ss                                                   

                                                                                                  

**************************************************************************************************

## 2022-10-19 NOTE — RAD REPORT
EXAM DESCRIPTION:  CT - Facial Bones W/ Mpr - 10/19/2022 11:07 am

 

CLINICAL HISTORY:  Facial trauma, blunt force trauma to the mandible

 

COMPARISON:  CT head and cervical spine same date

 

TECHNIQUE:  Axial 2 millimeter thick images of the facial bones were obtained with sagittal and coron
al reconstruction imaging.

 

All CT scans are performed using dose optimization technique as appropriate and may include automated
 exposure control or mA/KV adjustment according to patient size.

 

FINDINGS:  Condyles of the mandible are normally positioned. No mandible fracture identified. No faci
al bone fracture is seen. Patient has prominent right deviation of the nasal septum.

 

Paranasal sinuses are clear. No globe or orbital content abnormality identifiable. Mastoid air cells 
and middle ears are clear.

 

IMPRESSION:  No facial bone fracture or acute CT facial bone finding.

 

Specifically, condyles of the mandible are normally positioned. No mandible fracture is seen.